# Patient Record
Sex: FEMALE | Race: WHITE | Employment: OTHER | URBAN - METROPOLITAN AREA
[De-identification: names, ages, dates, MRNs, and addresses within clinical notes are randomized per-mention and may not be internally consistent; named-entity substitution may affect disease eponyms.]

---

## 2017-03-21 DIAGNOSIS — E28.2 PCOS (POLYCYSTIC OVARIAN SYNDROME): Primary | ICD-10-CM

## 2017-03-21 LAB — HBA1C MFR BLD: 5.8 % (ref 4.3–6)

## 2017-03-21 PROCEDURE — 83036 HEMOGLOBIN GLYCOSYLATED A1C: CPT | Performed by: FAMILY MEDICINE

## 2017-03-21 PROCEDURE — 36415 COLL VENOUS BLD VENIPUNCTURE: CPT | Performed by: FAMILY MEDICINE

## 2018-03-06 DIAGNOSIS — E28.2 POLYCYSTIC OVARIES: Primary | ICD-10-CM

## 2018-03-06 LAB — HBA1C MFR BLD: 5.7 % (ref 4.3–6)

## 2018-03-06 PROCEDURE — 80069 RENAL FUNCTION PANEL: CPT

## 2018-03-06 PROCEDURE — 83036 HEMOGLOBIN GLYCOSYLATED A1C: CPT

## 2018-03-06 PROCEDURE — 36415 COLL VENOUS BLD VENIPUNCTURE: CPT

## 2018-03-06 PROCEDURE — 80061 LIPID PANEL: CPT

## 2018-03-07 LAB
ALBUMIN SERPL-MCNC: 3.5 G/DL (ref 3.4–5)
ANION GAP SERPL CALCULATED.3IONS-SCNC: 5 MMOL/L (ref 3–14)
BUN SERPL-MCNC: 8 MG/DL (ref 7–30)
CALCIUM SERPL-MCNC: 8.8 MG/DL (ref 8.5–10.1)
CHLORIDE SERPL-SCNC: 109 MMOL/L (ref 94–109)
CHOLEST SERPL-MCNC: 179 MG/DL
CO2 SERPL-SCNC: 26 MMOL/L (ref 20–32)
CREAT SERPL-MCNC: 0.76 MG/DL (ref 0.52–1.04)
GFR SERPL CREATININE-BSD FRML MDRD: >90 ML/MIN/1.7M2
GLUCOSE SERPL-MCNC: 91 MG/DL (ref 70–99)
HDLC SERPL-MCNC: 38 MG/DL
LDLC SERPL CALC-MCNC: 110 MG/DL
NONHDLC SERPL-MCNC: 141 MG/DL
PHOSPHATE SERPL-MCNC: 2 MG/DL (ref 2.5–4.5)
POTASSIUM SERPL-SCNC: 4.4 MMOL/L (ref 3.4–5.3)
SODIUM SERPL-SCNC: 140 MMOL/L (ref 133–144)
TRIGL SERPL-MCNC: 153 MG/DL

## 2019-05-07 DIAGNOSIS — E78.2 MIXED HYPERLIPIDEMIA: ICD-10-CM

## 2019-05-07 DIAGNOSIS — E28.2 POLYCYSTIC OVARIES: Primary | ICD-10-CM

## 2019-05-07 LAB
ALBUMIN SERPL-MCNC: 3.8 G/DL (ref 3.4–5)
ANION GAP SERPL CALCULATED.3IONS-SCNC: 8 MMOL/L (ref 3–14)
BUN SERPL-MCNC: 9 MG/DL (ref 7–30)
CALCIUM SERPL-MCNC: 9.3 MG/DL (ref 8.5–10.1)
CHLORIDE SERPL-SCNC: 110 MMOL/L (ref 94–109)
CHOLEST SERPL-MCNC: 183 MG/DL
CO2 SERPL-SCNC: 23 MMOL/L (ref 20–32)
CREAT SERPL-MCNC: 0.9 MG/DL (ref 0.52–1.04)
GFR SERPL CREATININE-BSD FRML MDRD: 86 ML/MIN/{1.73_M2}
GLUCOSE SERPL-MCNC: 101 MG/DL (ref 70–99)
HBA1C MFR BLD: 5.7 % (ref 0–5.6)
HDLC SERPL-MCNC: 34 MG/DL
LDLC SERPL CALC-MCNC: 125 MG/DL
NONHDLC SERPL-MCNC: 149 MG/DL
PHOSPHATE SERPL-MCNC: 2.3 MG/DL (ref 2.5–4.5)
POTASSIUM SERPL-SCNC: 4.5 MMOL/L (ref 3.4–5.3)
SODIUM SERPL-SCNC: 141 MMOL/L (ref 133–144)
TRIGL SERPL-MCNC: 121 MG/DL

## 2019-05-07 PROCEDURE — 80069 RENAL FUNCTION PANEL: CPT | Performed by: INTERNAL MEDICINE

## 2019-05-07 PROCEDURE — 80061 LIPID PANEL: CPT | Performed by: INTERNAL MEDICINE

## 2019-05-07 PROCEDURE — 36415 COLL VENOUS BLD VENIPUNCTURE: CPT | Performed by: INTERNAL MEDICINE

## 2019-05-07 PROCEDURE — 83036 HEMOGLOBIN GLYCOSYLATED A1C: CPT | Performed by: INTERNAL MEDICINE

## 2020-08-27 ENCOUNTER — OFFICE VISIT (OUTPATIENT)
Dept: FAMILY MEDICINE | Facility: CLINIC | Age: 30
End: 2020-08-27
Payer: COMMERCIAL

## 2020-08-27 VITALS
RESPIRATION RATE: 17 BRPM | HEART RATE: 78 BPM | WEIGHT: 293 LBS | OXYGEN SATURATION: 98 % | BODY MASS INDEX: 44.41 KG/M2 | DIASTOLIC BLOOD PRESSURE: 83 MMHG | SYSTOLIC BLOOD PRESSURE: 134 MMHG | HEIGHT: 68 IN | TEMPERATURE: 98.4 F

## 2020-08-27 DIAGNOSIS — F43.23 ADJUSTMENT DISORDER WITH MIXED ANXIETY AND DEPRESSED MOOD: ICD-10-CM

## 2020-08-27 DIAGNOSIS — E28.2 PCOS (POLYCYSTIC OVARIAN SYNDROME): Primary | ICD-10-CM

## 2020-08-27 DIAGNOSIS — E66.01 MORBID OBESITY (H): ICD-10-CM

## 2020-08-27 LAB
ALBUMIN SERPL-MCNC: 3.4 G/DL (ref 3.4–5)
ALP SERPL-CCNC: 76 U/L (ref 40–150)
ALT SERPL W P-5'-P-CCNC: 15 U/L (ref 0–50)
ANION GAP SERPL CALCULATED.3IONS-SCNC: 4 MMOL/L (ref 3–14)
AST SERPL W P-5'-P-CCNC: 9 U/L (ref 0–45)
BASOPHILS # BLD AUTO: 0 10E9/L (ref 0–0.2)
BASOPHILS NFR BLD AUTO: 0.4 %
BILIRUB SERPL-MCNC: 0.4 MG/DL (ref 0.2–1.3)
BUN SERPL-MCNC: 11 MG/DL (ref 7–30)
CALCIUM SERPL-MCNC: 9.3 MG/DL (ref 8.5–10.1)
CHLORIDE SERPL-SCNC: 112 MMOL/L (ref 94–109)
CHOLEST SERPL-MCNC: 178 MG/DL
CO2 SERPL-SCNC: 23 MMOL/L (ref 20–32)
CREAT SERPL-MCNC: 0.87 MG/DL (ref 0.52–1.04)
DIFFERENTIAL METHOD BLD: NORMAL
EOSINOPHIL # BLD AUTO: 0.1 10E9/L (ref 0–0.7)
EOSINOPHIL NFR BLD AUTO: 1.1 %
ERYTHROCYTE [DISTWIDTH] IN BLOOD BY AUTOMATED COUNT: 12.7 % (ref 10–15)
GFR SERPL CREATININE-BSD FRML MDRD: 88 ML/MIN/{1.73_M2}
GLUCOSE SERPL-MCNC: 103 MG/DL (ref 70–99)
HBA1C MFR BLD: 5.7 % (ref 0–5.6)
HCT VFR BLD AUTO: 43.2 % (ref 35–47)
HDLC SERPL-MCNC: 45 MG/DL
HGB BLD-MCNC: 14.4 G/DL (ref 11.7–15.7)
LDLC SERPL CALC-MCNC: 104 MG/DL
LYMPHOCYTES # BLD AUTO: 2.6 10E9/L (ref 0.8–5.3)
LYMPHOCYTES NFR BLD AUTO: 35.5 %
MCH RBC QN AUTO: 30.2 PG (ref 26.5–33)
MCHC RBC AUTO-ENTMCNC: 33.3 G/DL (ref 31.5–36.5)
MCV RBC AUTO: 91 FL (ref 78–100)
MONOCYTES # BLD AUTO: 0.3 10E9/L (ref 0–1.3)
MONOCYTES NFR BLD AUTO: 4 %
NEUTROPHILS # BLD AUTO: 4.3 10E9/L (ref 1.6–8.3)
NEUTROPHILS NFR BLD AUTO: 59 %
NONHDLC SERPL-MCNC: 133 MG/DL
PLATELET # BLD AUTO: 263 10E9/L (ref 150–450)
POTASSIUM SERPL-SCNC: 4.1 MMOL/L (ref 3.4–5.3)
PROT SERPL-MCNC: 7.5 G/DL (ref 6.8–8.8)
RBC # BLD AUTO: 4.77 10E12/L (ref 3.8–5.2)
SODIUM SERPL-SCNC: 139 MMOL/L (ref 133–144)
TRIGL SERPL-MCNC: 143 MG/DL
TSH SERPL DL<=0.005 MIU/L-ACNC: 1.79 MU/L (ref 0.4–4)
WBC # BLD AUTO: 7.2 10E9/L (ref 4–11)

## 2020-08-27 PROCEDURE — 80050 GENERAL HEALTH PANEL: CPT | Performed by: PHYSICIAN ASSISTANT

## 2020-08-27 PROCEDURE — 96127 BRIEF EMOTIONAL/BEHAV ASSMT: CPT | Performed by: PHYSICIAN ASSISTANT

## 2020-08-27 PROCEDURE — 36415 COLL VENOUS BLD VENIPUNCTURE: CPT | Performed by: PHYSICIAN ASSISTANT

## 2020-08-27 PROCEDURE — 83036 HEMOGLOBIN GLYCOSYLATED A1C: CPT | Performed by: PHYSICIAN ASSISTANT

## 2020-08-27 PROCEDURE — 80061 LIPID PANEL: CPT | Performed by: PHYSICIAN ASSISTANT

## 2020-08-27 PROCEDURE — 99203 OFFICE O/P NEW LOW 30 MIN: CPT | Performed by: PHYSICIAN ASSISTANT

## 2020-08-27 RX ORDER — METFORMIN HCL 500 MG
1000 TABLET, EXTENDED RELEASE 24 HR ORAL
COMMUNITY
End: 2021-06-24

## 2020-08-27 RX ORDER — ETONOGESTREL AND ETHINYL ESTRADIOL VAGINAL RING .015; .12 MG/D; MG/D
1 RING VAGINAL
COMMUNITY
End: 2022-11-29

## 2020-08-27 RX ORDER — METFORMIN HCL 500 MG
1000 TABLET, EXTENDED RELEASE 24 HR ORAL
Qty: 180 TABLET | Refills: 0 | Status: SHIPPED | OUTPATIENT
Start: 2020-08-27 | End: 2020-10-29

## 2020-08-27 ASSESSMENT — MIFFLIN-ST. JEOR: SCORE: 2123.16

## 2020-08-27 NOTE — PROGRESS NOTES
"Subjective     Livia Fallon is a 30 year old female who presents to clinic today for the following health issues:    HPI     Patient would like to discuss her recent Polycystic Ovarian Syndrome-    31 y/o NP female here to discuss a couple of issues.  She was dx with PCOS many years ago.  She has followed with endocrine, but been over 1 year since last visit.  She has been stable on metformin and nuvaring..  This did help her lose some weight initially, and did help her energy, but over time those effects have not been present.  Her last A1c was 5.7.  Does not check blood sugars at home.  Does not follow any specific diet.    She has also been having some more depressive and anxiety type symptoms.  She has not really had much of this in the past, but does think the many external stressors are playing a role.            Review of Systems   Constitutional, HEENT, cardiovascular, pulmonary, gi and gu systems are negative, except as otherwise noted.      Objective    /83   Pulse 78   Temp 98.4  F (36.9  C) (Tympanic)   Resp 17   Ht 1.715 m (5' 7.5\")   Wt 136.8 kg (301 lb 8 oz)   LMP 08/17/2020 (Approximate)   SpO2 98%   Breastfeeding No   BMI 46.52 kg/m    Body mass index is 46.52 kg/m .  Physical Exam   GENERAL: alert and no distress  EYES: Eyes grossly normal to inspection  RESP: lungs clear to auscultation - no rales, rhonchi or wheezes  CV: regular rate and rhythm, normal S1 S2, no S3 or S4, no murmur, click or rub, no peripheral edema and peripheral pulses strong  PSYCH: mentation appears normal, affect normal/bright            Assessment & Plan     PCOS (polycystic ovarian syndrome)  Will update labs.  Depending on blood sugars, may taper up metformin.  Also discussed the potential benefit of OTC NAC.  - Comprehensive metabolic panel  - Hemoglobin A1c  - Lipid panel reflex to direct LDL Fasting  - TSH with free T4 reflex  - metFORMIN (GLUCOPHAGE-XR) 500 MG 24 hr tablet; Take 2 tablets (1,000 mg) " "by mouth daily (with dinner)  - CBC with platelets differential    Morbid obesity (H)  Spent significant time discussing dietary and lifestyle changes to help facilitate weight loss.    Adjustment disorder with mixed anxiety and depressed mood  Discussed options for treatment, at this time going to hold off on medication trial.  Will continue to follow up based on symptoms.  - EMOTIONAL / BEHAVIORAL ASSESSMENT     BMI:   Estimated body mass index is 46.52 kg/m  as calculated from the following:    Height as of this encounter: 1.715 m (5' 7.5\").    Weight as of this encounter: 136.8 kg (301 lb 8 oz).   Weight management plan: Discussed healthy diet and exercise guidelines            No follow-ups on file.    Jhon Solomon PA-C  [facility]    "

## 2020-08-27 NOTE — NURSING NOTE
"Chief Complaint   Patient presents with     Consult     Polycystic Ovarian Syndrome     /83   Pulse 78   Temp 98.4  F (36.9  C) (Tympanic)   Resp 17   Ht 1.715 m (5' 7.5\")   Wt 136.8 kg (301 lb 8 oz)   LMP 08/17/2020 (Approximate)   SpO2 98%   Breastfeeding No   BMI 46.52 kg/m   Estimated body mass index is 46.52 kg/m  as calculated from the following:    Height as of this encounter: 1.715 m (5' 7.5\").    Weight as of this encounter: 136.8 kg (301 lb 8 oz).  Medication Reconciliation: complete        Health Maintenance Due Pending Provider Review:  Pap Smear    Pt reported. Sent to abstracting.    Rosa Chiu MA  [facility]  [phone]  "

## 2020-08-28 ASSESSMENT — ANXIETY QUESTIONNAIRES
7. FEELING AFRAID AS IF SOMETHING AWFUL MIGHT HAPPEN: NEARLY EVERY DAY
GAD7 TOTAL SCORE: 13
5. BEING SO RESTLESS THAT IT IS HARD TO SIT STILL: SEVERAL DAYS
2. NOT BEING ABLE TO STOP OR CONTROL WORRYING: SEVERAL DAYS
3. WORRYING TOO MUCH ABOUT DIFFERENT THINGS: MORE THAN HALF THE DAYS
6. BECOMING EASILY ANNOYED OR IRRITABLE: MORE THAN HALF THE DAYS
IF YOU CHECKED OFF ANY PROBLEMS ON THIS QUESTIONNAIRE, HOW DIFFICULT HAVE THESE PROBLEMS MADE IT FOR YOU TO DO YOUR WORK, TAKE CARE OF THINGS AT HOME, OR GET ALONG WITH OTHER PEOPLE: SOMEWHAT DIFFICULT
1. FEELING NERVOUS, ANXIOUS, OR ON EDGE: SEVERAL DAYS
4. TROUBLE RELAXING: NEARLY EVERY DAY

## 2020-08-28 ASSESSMENT — PATIENT HEALTH QUESTIONNAIRE - PHQ9: SUM OF ALL RESPONSES TO PHQ QUESTIONS 1-9: 9

## 2020-08-28 NOTE — RESULT ENCOUNTER NOTE
Dear Livia    Your test results are attached, feel free to contact me via Zuffle     It was very nice to meet and chat with you yesterday.  As you can see from your results, things look pretty stable from last year.  Your A1c (the 3 month blood sugar average) is at 5.7.  This is just over the desired range.  One option is to increase your metformin.  You currently take 1000 mg and increasing to 1500 mg makes sense.  This may help augment some weight loss as well.  The most important thing is to be consistent with the healthy food choices and quantities.  The next step is do you want to pursue medication to help your mood at this time.  Other options include counseling, and also those healthy lifestyle/diet choice will also help the brain chemistry.  Let me know what you think.    Emmett Solomon PA-C

## 2020-08-29 ASSESSMENT — ANXIETY QUESTIONNAIRES: GAD7 TOTAL SCORE: 13

## 2020-09-01 ENCOUNTER — MYC MEDICAL ADVICE (OUTPATIENT)
Dept: FAMILY MEDICINE | Facility: CLINIC | Age: 30
End: 2020-09-01

## 2020-09-01 DIAGNOSIS — F43.23 ADJUSTMENT DISORDER WITH MIXED ANXIETY AND DEPRESSED MOOD: Primary | ICD-10-CM

## 2020-09-01 RX ORDER — ESCITALOPRAM OXALATE 10 MG/1
10 TABLET ORAL DAILY
Qty: 30 TABLET | Refills: 1 | Status: SHIPPED | OUTPATIENT
Start: 2020-09-01 | End: 2020-11-04

## 2020-10-29 ENCOUNTER — MYC REFILL (OUTPATIENT)
Dept: FAMILY MEDICINE | Facility: CLINIC | Age: 30
End: 2020-10-29

## 2020-10-29 ENCOUNTER — MYC MEDICAL ADVICE (OUTPATIENT)
Dept: FAMILY MEDICINE | Facility: CLINIC | Age: 30
End: 2020-10-29

## 2020-10-29 DIAGNOSIS — E28.2 PCOS (POLYCYSTIC OVARIAN SYNDROME): ICD-10-CM

## 2020-10-29 RX ORDER — METFORMIN HCL 500 MG
1000 TABLET, EXTENDED RELEASE 24 HR ORAL
Qty: 180 TABLET | Refills: 0 | Status: SHIPPED | OUTPATIENT
Start: 2020-10-29 | End: 2020-10-29

## 2020-10-29 RX ORDER — METFORMIN HCL 500 MG
TABLET, EXTENDED RELEASE 24 HR ORAL
Qty: 270 TABLET | Refills: 0 | Status: SHIPPED | OUTPATIENT
Start: 2020-10-29 | End: 2021-01-14

## 2020-10-29 NOTE — TELEPHONE ENCOUNTER
JS,  Please see [employer] message below.  Order T'd up.  Thanks,  Harika Guillory RN      Note from 8/27/2020 OV:  It was very nice to meet and chat with you yesterday.  As you can see from your results, things look pretty stable from last year.  Your A1c (the 3 month blood sugar average) is at 5.7.  This is just over the desired range.  One option is to increase your metformin.  You currently take 1000 mg and increasing to 1500 mg makes sense.  This may help augment some weight loss as well

## 2020-10-29 NOTE — TELEPHONE ENCOUNTER
"Requested Prescriptions   Pending Prescriptions Disp Refills     metFORMIN (GLUCOPHAGE-XR) 500 MG 24 hr tablet 180 tablet 0     Sig: Take 2 tablets (1,000 mg) by mouth daily (with dinner)       Biguanide Agents Passed - 10/29/2020 11:13 AM        Passed - Patient is age 10 or older        Passed - Recent (12 mo) or future (30 days) visit within the authorizing provider's specialty      Patient has had an office visit with the authorizing provider or a provider within the authorizing providers department within the previous 12 mos or has a future within next 30 days. See \"Patient Info\" tab in inbasket, or \"Choose Columns\" in Meds & Orders section of the refill encounter.              Passed - Patient does NOT have a diagnosis of CHF.        Passed - Medication is active on med list        Passed - Patient is not pregnant        Passed - Patient has not had a positive pregnancy test within the past 12 mos.          Prescription approved per [facility].  "

## 2020-11-22 ENCOUNTER — HEALTH MAINTENANCE LETTER (OUTPATIENT)
Age: 30
End: 2020-11-22

## 2020-12-01 ENCOUNTER — TRANSFERRED RECORDS (OUTPATIENT)
Dept: MULTI SPECIALTY CLINIC | Facility: CLINIC | Age: 30
End: 2020-12-01

## 2020-12-01 LAB — PAP SMEAR - HIM PATIENT REPORTED: NEGATIVE

## 2021-01-01 DIAGNOSIS — F43.23 ADJUSTMENT DISORDER WITH MIXED ANXIETY AND DEPRESSED MOOD: ICD-10-CM

## 2021-01-02 RX ORDER — ESCITALOPRAM OXALATE 10 MG/1
TABLET ORAL
Qty: 30 TABLET | Refills: 1 | OUTPATIENT
Start: 2021-01-02

## 2021-01-14 ENCOUNTER — VIRTUAL VISIT (OUTPATIENT)
Dept: FAMILY MEDICINE | Facility: CLINIC | Age: 31
End: 2021-01-14
Payer: COMMERCIAL

## 2021-01-14 DIAGNOSIS — F43.23 ADJUSTMENT DISORDER WITH MIXED ANXIETY AND DEPRESSED MOOD: ICD-10-CM

## 2021-01-14 DIAGNOSIS — E28.2 PCOS (POLYCYSTIC OVARIAN SYNDROME): ICD-10-CM

## 2021-01-14 PROCEDURE — 99214 OFFICE O/P EST MOD 30 MIN: CPT | Mod: 95 | Performed by: PHYSICIAN ASSISTANT

## 2021-01-14 RX ORDER — METFORMIN HCL 500 MG
TABLET, EXTENDED RELEASE 24 HR ORAL
Qty: 270 TABLET | Refills: 0 | Status: SHIPPED | OUTPATIENT
Start: 2021-01-14 | End: 2021-04-21

## 2021-01-14 RX ORDER — ESCITALOPRAM OXALATE 10 MG/1
15 TABLET ORAL DAILY
Qty: 135 TABLET | Refills: 1 | Status: SHIPPED | OUTPATIENT
Start: 2021-01-14 | End: 2021-01-27

## 2021-01-14 NOTE — PROGRESS NOTES
Livia is a 30 year old who is being evaluated via a billable video visit.      How would you like to obtain your AVS? Mayur  If the video visit is dropped, the invitation should be resent by: Mayur  Will anyone else be joining your video visit? No    Video Start Time: 11:45 AM  Assessment & Plan     Adjustment disorder with mixed anxiety and depressed mood  Much improved, very happy with how it is works.  No adjustments needed at this time.  - escitalopram (LEXAPRO) 10 MG tablet; Take 1.5 tablets (15 mg) by mouth daily    PCOS (polycystic ovarian syndrome)  The increased dose has helped some symptoms as well.  - metFORMIN (GLUCOPHAGE-XR) 500 MG 24 hr tablet; Take 3 tablets (1500 mg) daily with dinner          Return in about 6 months (around 7/14/2021).    Jhon Solomon PA-C  [facility]     Livia is a 30 year old who presents to clinic today for the following health issues     HPI       Depression and Anxiety Follow-Up    How are you doing with your depression since your last visit? Improved     How are you doing with your anxiety since your last visit?  Improved     Are you having other symptoms that might be associated with depression or anxiety? No    Have you had a significant life event? No     Do you have any concerns with your use of alcohol or other drugs? No    Social History     Tobacco Use     Smoking status: Never Smoker     Smokeless tobacco: Never Used   Substance Use Topics     Alcohol use: Yes     Comment: once/month     Drug use: Yes     Types: Marijuana     Comment: weed every few weeks     PHQ 8/28/2020 1/5/2021   PHQ-9 Total Score 9 12   Q9: Thoughts of better off dead/self-harm past 2 weeks Not at all Several days     PRISCILLA-7 SCORE 8/28/2020 1/5/2021   Total Score 13 4         Suicide Assessment Five-step Evaluation and Treatment (SAFE-T)        Review of Systems   Constitutional, HEENT, cardiovascular, pulmonary, gi and gu systems are negative,  except as otherwise noted.      Objective           Vitals:  No vitals were obtained today due to virtual visit.    Physical Exam   GENERAL: alert and no distress  EYES: Eyes grossly normal to inspection.  No discharge or erythema, or obvious scleral/conjunctival abnormalities.  RESP: No audible wheeze, cough, or visible cyanosis.  No visible retractions or increased work of breathing.    SKIN: Visible skin clear. No significant rash, abnormal pigmentation or lesions.  NEURO: Cranial nerves grossly intact.  Mentation and speech appropriate for age.  PSYCH: Mentation appears normal, affect normal/bright, judgement and insight intact, normal speech and appearance well-groomed.                Video-Visit Details    Type of service:  Video Visit    Video End Time:11:54 AM    Originating Location (pt. Location): Home    Distant Location (provider location):  [facility]     Platform used for Video Visit: Topic

## 2021-01-14 NOTE — Clinical Note
Please abstract the following data from this visit with this patient into the appropriate field in Epic:    Tests that can be patient reported without a hard copy:    Pap smear done on this date: 12/1/2020 (approximately), [facility], results were normal.     Other Tests found in the patient's chart through Chart Review/Care Everywhere:    {Abstract Quality List (Optional):290433}    Note to Abstraction: If this section is blank, no results were found via Chart Review/Care Everywhere.

## 2021-01-25 ENCOUNTER — TELEPHONE (OUTPATIENT)
Dept: FAMILY MEDICINE | Facility: CLINIC | Age: 31
End: 2021-01-25

## 2021-01-25 DIAGNOSIS — F43.23 ADJUSTMENT DISORDER WITH MIXED ANXIETY AND DEPRESSED MOOD: Primary | ICD-10-CM

## 2021-01-25 NOTE — TELEPHONE ENCOUNTER
PA team,      Received fax from [facility] at [address] for Escitalopram 10 mg - take 15 mg once daily.    Plan phone number : [phone]  ID# [id number]    Thanks,  Harika Guillory RN

## 2021-01-26 NOTE — TELEPHONE ENCOUNTER
Central Prior Authorization Team  Phone: [phone]    PA Initiation    Medication: Escitalopram  Insurance Company: [employer] - Phone [phone] Fax [phone]  Pharmacy Filling the Rx: [id number] [address]  Filling Pharmacy Phone: [phone]  Filling Pharmacy Fax:    Start Date: 1/26/2021

## 2021-01-27 ENCOUNTER — TELEPHONE (OUTPATIENT)
Dept: FAMILY MEDICINE | Facility: CLINIC | Age: 31
End: 2021-01-27

## 2021-01-27 RX ORDER — ESCITALOPRAM OXALATE 20 MG/1
10 TABLET ORAL DAILY
Qty: 135 TABLET | Refills: 1 | Status: SHIPPED | OUTPATIENT
Start: 2021-01-27 | End: 2021-06-24

## 2021-01-27 RX ORDER — ESCITALOPRAM OXALATE 10 MG/1
5 TABLET ORAL DAILY
Qty: 135 TABLET | Refills: 1 | Status: SHIPPED | OUTPATIENT
Start: 2021-01-27 | End: 2021-06-24

## 2021-01-27 NOTE — TELEPHONE ENCOUNTER
Spoke with pharmacy.  Reviewed Lexapro Rx instructions for both 10 mg and 20 mg sent in today.  Harika Guillory RN

## 2021-01-27 NOTE — TELEPHONE ENCOUNTER
Received a call from Lg at [facility], they will covered Escitalopram 20mg strength (taking half of a tablet) and 10mg strength (taking half of a tablet) to make up the dose for 15mg. Please send new rx's to the pharmacy for these strengths.

## 2021-01-27 NOTE — TELEPHONE ENCOUNTER
JS,    Please see message below from 8:32 today.  Orders T'd up.    Thanks,  Hariak Guillory RN

## 2021-01-27 NOTE — TELEPHONE ENCOUNTER
Talked to Lg at [facility]- since pt previously got one month supply of the 10mg, claims at the pharmacy are rejecting against the last fill. He did put in a one time override for the pharmacy to fill the 20mg and 10mg strengths. There shouldn't be any issues after this fill. The pharmacy received paid claims.

## 2021-01-27 NOTE — TELEPHONE ENCOUNTER
Reason for Call:  Other call back    Detailed comments: Sukh[facility] calling needs Clarification on Instructions for  escitalopram (LEXAPRO) 20 MG tablet    Phone Number Sukhdev can be reached at: Other phone number:  [phone]    Best Time: Today    Can we leave a detailed message on this number? Not Applicable    Call taken on 1/27/2021 at 9:44 AM by Amarilys Carreno

## 2021-03-09 DIAGNOSIS — F43.23 ADJUSTMENT DISORDER WITH MIXED ANXIETY AND DEPRESSED MOOD: ICD-10-CM

## 2021-03-09 NOTE — TELEPHONE ENCOUNTER
Changed from having two precriptions to equal 15 mg,  To just on prescription for  10 mg with directions for 1.5 tabletes.

## 2021-03-10 RX ORDER — ESCITALOPRAM OXALATE 10 MG/1
15 TABLET ORAL DAILY
Qty: 135 TABLET | Refills: 1 | OUTPATIENT
Start: 2021-03-10

## 2021-04-21 DIAGNOSIS — E28.2 PCOS (POLYCYSTIC OVARIAN SYNDROME): ICD-10-CM

## 2021-04-21 RX ORDER — METFORMIN HCL 500 MG
TABLET, EXTENDED RELEASE 24 HR ORAL
Qty: 270 TABLET | Refills: 0 | Status: SHIPPED | OUTPATIENT
Start: 2021-04-21 | End: 2021-06-24

## 2021-05-04 ENCOUNTER — IMMUNIZATION (OUTPATIENT)
Dept: NURSING | Facility: CLINIC | Age: 31
End: 2021-05-04
Payer: COMMERCIAL

## 2021-05-04 PROCEDURE — 0001A PR COVID VAC PFIZER DIL RECON 30 MCG/0.3 ML IM: CPT

## 2021-05-04 PROCEDURE — 91300 PR COVID VAC PFIZER DIL RECON 30 MCG/0.3 ML IM: CPT

## 2021-05-25 ENCOUNTER — IMMUNIZATION (OUTPATIENT)
Dept: NURSING | Facility: CLINIC | Age: 31
End: 2021-05-25
Attending: INTERNAL MEDICINE
Payer: COMMERCIAL

## 2021-05-25 PROCEDURE — 0002A PR COVID VAC PFIZER DIL RECON 30 MCG/0.3 ML IM: CPT

## 2021-05-25 PROCEDURE — 91300 PR COVID VAC PFIZER DIL RECON 30 MCG/0.3 ML IM: CPT

## 2021-06-23 NOTE — PROGRESS NOTES
"Livia is a 30 year old who is being evaluated via a billable video visit.      How would you like to obtain your AVS? MyChart  If the video visit is dropped, the invitation should be resent by:   Will anyone else be joining your video visit? No    Video Start Time: 12:24 PM    Assessment & Plan     PCOS (polycystic ovarian syndrome)  Has been working well, symptoms controlled.  - metFORMIN (GLUCOPHAGE-XR) 500 MG 24 hr tablet; Take 3 tablets (1500 mg) daily with dinner    Adjustment disorder with mixed anxiety and depressed mood  Will trial an increase as this has been quite helpful for her in the past.  Continue to work on non medication treatments, exercise, healthy diet, consistent sleep, etc.  - escitalopram (LEXAPRO) 20 MG tablet; Take 1 tablet (20 mg) by mouth daily              BMI:   Estimated body mass index is 46.52 kg/m  as calculated from the following:    Height as of 8/27/20: 1.715 m (5' 7.5\").    Weight as of 8/27/20: 136.8 kg (301 lb 8 oz).           Return in about 4 weeks (around 7/22/2021).    ALANNA Harding [facility]   Livia is a 30 year old who presents for the following health issues     HPI     Depression and Anxiety Follow-Up    How are you doing with your depression since your last visit? Pt states she felt really good and now feels less of herself     How are you doing with your anxiety since your last visit?  Pt states she felt really good and now feels less of herself     Are you having other symptoms that might be associated with depression or anxiety? Yes:  wanting to do less with friends    Have you had a significant life event? OTHER: money and job      Do you have any concerns with your use of alcohol or other drugs? No    Social History     Tobacco Use     Smoking status: Never Smoker     Smokeless tobacco: Never Used   Substance Use Topics     Alcohol use: Yes     Comment: once/month     Drug use: Yes     Types: Marijuana     " Comment: weed every few weeks     PHQ 8/28/2020 1/5/2021   PHQ-9 Total Score 9 12   Q9: Thoughts of better off dead/self-harm past 2 weeks Not at all Several days     PRISCILLA-7 SCORE 8/28/2020 1/5/2021   Total Score 13 4         Suicide Assessment Five-step Evaluation and Treatment (SAFE-T)      How many servings of fruits and vegetables do you eat daily?  Varies salad and V8    On average, how many sweetened beverages do you drink each day (Examples: soda, juice, sweet tea, etc.  Do NOT count diet or artificially sweetened beverages)?   V8, tea with honey and sugar     How many days per week do you exercise enough to make your heart beat faster? Not enough technically 3 days if you count work     How many minutes a day do you exercise enough to make your heart beat faster?   How many days per week do you miss taking your medication? Some days     What makes it hard for you to take your medications?  remembering to take        Review of Systems   Constitutional, HEENT, cardiovascular, pulmonary, gi and gu systems are negative, except as otherwise noted.      Objective           Vitals:  No vitals were obtained today due to virtual visit.    Physical Exam   GENERAL: Healthy, alert and no distress  EYES: Eyes grossly normal to inspection.  No discharge or erythema, or obvious scleral/conjunctival abnormalities.  RESP: No audible wheeze, cough, or visible cyanosis.  No visible retractions or increased work of breathing.    SKIN: Visible skin clear. No significant rash, abnormal pigmentation or lesions.  NEURO: Cranial nerves grossly intact.  Mentation and speech appropriate for age.  PSYCH: Mentation appears normal, affect normal/bright, judgement and insight intact, normal speech and appearance well-groomed.                Video-Visit Details    Type of service:  Video Visit    Video End Time:12:33 PM    Originating Location (pt. Location): Home    Distant Location (provider location):  [facility]      Platform used for Video Visit: Sukhwinder

## 2021-06-24 ENCOUNTER — VIRTUAL VISIT (OUTPATIENT)
Dept: FAMILY MEDICINE | Facility: CLINIC | Age: 31
End: 2021-06-24
Payer: COMMERCIAL

## 2021-06-24 DIAGNOSIS — F43.23 ADJUSTMENT DISORDER WITH MIXED ANXIETY AND DEPRESSED MOOD: ICD-10-CM

## 2021-06-24 DIAGNOSIS — E28.2 PCOS (POLYCYSTIC OVARIAN SYNDROME): ICD-10-CM

## 2021-06-24 PROCEDURE — 99213 OFFICE O/P EST LOW 20 MIN: CPT | Mod: 95 | Performed by: PHYSICIAN ASSISTANT

## 2021-06-24 RX ORDER — ESCITALOPRAM OXALATE 20 MG/1
20 TABLET ORAL DAILY
Qty: 90 TABLET | Refills: 1 | Status: SHIPPED | OUTPATIENT
Start: 2021-06-24 | End: 2022-01-04

## 2021-06-24 RX ORDER — ESCITALOPRAM OXALATE 20 MG/1
20 TABLET ORAL DAILY
Qty: 90 TABLET | Refills: 1 | Status: SHIPPED | OUTPATIENT
Start: 2021-06-24 | End: 2021-06-24

## 2021-06-24 RX ORDER — METFORMIN HCL 500 MG
TABLET, EXTENDED RELEASE 24 HR ORAL
Qty: 270 TABLET | Refills: 1 | Status: SHIPPED | OUTPATIENT
Start: 2021-06-24 | End: 2021-12-13

## 2021-09-19 ENCOUNTER — HEALTH MAINTENANCE LETTER (OUTPATIENT)
Age: 31
End: 2021-09-19

## 2021-12-12 ENCOUNTER — NURSE TRIAGE (OUTPATIENT)
Dept: NURSING | Facility: CLINIC | Age: 31
End: 2021-12-12
Payer: COMMERCIAL

## 2021-12-12 DIAGNOSIS — E28.2 PCOS (POLYCYSTIC OVARIAN SYNDROME): ICD-10-CM

## 2021-12-12 NOTE — TELEPHONE ENCOUNTER
Patient says she is out of Metformin medication and the pharmacy says they have requested twice with no answer.   Triage guidelines recommend to call pcp within 24 hours. Caller verbalized and understands directives.  COVID 19 Nurse Triage Plan/Patient Instructions    Please be aware that novel coronavirus (COVID-19) may be circulating in the community. If you develop symptoms such as fever, cough, or SOB or if you have concerns about the presence of another infection including coronavirus (COVID-19), please contact your health care provider or visit [email or web address].     Disposition/Instructions    Virtual Visit with provider recommended. Reference Visit Selection Guide.    Thank you for taking steps to prevent the spread of this virus.  o Limit your contact with others.  o Wear a simple mask to cover your cough.  o Wash your hands well and often.    Resources    [facility]: About COVID-19: [email or web address]    CDC: What to Do If You're Sick: www.cdc.gov/coronavirus/2019-ncov/about/steps-when-sick.html    CDC: Ending Home Isolation: www.cdc.gov/coronavirus/2019-ncov/hcp/disposition-in-home-patients.html     CDC: Caring for Someone: www.cdc.gov/coronavirus/2019-ncov/if-you-are-sick/care-for-someone.html     [facility]: Interim Guidance for Hospital Discharge to Home: www.health.[facility]/diseases/coronavirus/hcp/hospdischarge.pdf    [facility] clinical trials (COVID-19 research studies): clinicalaffairs.[facility]-clinical-trials     Below are the COVID-19 hotlines at the [facility] ([facility]). Interpreters are available.   o For health questions: Call 710-496-9347 or 1-129.828.1200 (7 a.m. to 7 p.m.)  o For questions about schools and childcare: Call 289-504-3881 or 1-695.996.3770 (7 a.m. to 7 p.m.)                   Reason for Disposition    [1] Caller has NON-URGENT medication question about med that PCP prescribed AND [2] triager unable to answer  "question    Additional Information    Negative: Drug overdose and triager unable to answer question    Negative: Caller requesting information unrelated to medicine    Negative: Caller requesting a prescription for Strep throat and has a positive culture result    Negative: Rash while taking a medication or within 3 days of stopping it    Negative: Immunization reaction suspected    Negative: [1] Asthma and [2] having symptoms of asthma (cough, wheezing, etc.)    Negative: [1] Influenza symptoms AND [2] anti-viral med prescription request, such as Tamiflu    Negative: [1] Symptom of illness (e.g., headache, abdominal pain, earache, vomiting) AND [2] more than mild    Negative: MORE THAN A DOUBLE DOSE of a prescription or over-the-counter (OTC) drug    Negative: [1] DOUBLE DOSE (an extra dose or lesser amount) of over-the-counter (OTC) drug AND [2] any symptoms (e.g., dizziness, nausea, pain, sleepiness)    Negative: [1] DOUBLE DOSE (an extra dose or lesser amount) of prescription drug AND [2] any symptoms (e.g., dizziness, nausea, pain, sleepiness)    Negative: Took another person's prescription drug    Negative: [1] DOUBLE DOSE (an extra dose or lesser amount) of prescription drug AND [2] NO symptoms (Exception: a double dose of antibiotics)    Negative: Diabetes drug error or overdose (e.g., took wrong type of insulin or took extra dose)    Negative: [1] Request for URGENT new prescription or refill of \"essential\" medication (i.e., likelihood of harm to patient if not taken) AND [2] triager unable to fill per unit policy    Negative: [1] Prescription not at pharmacy AND [2] was prescribed by PCP recently    Negative: [1] Pharmacy calling with prescription questions AND [2] triager unable to answer question    Negative: [1] Caller has URGENT medication question about med that PCP or specialist prescribed AND [2] triager unable to answer question    Protocols used: MEDICATION QUESTION CALL-A-AH      "

## 2021-12-13 RX ORDER — METFORMIN HCL 500 MG
TABLET, EXTENDED RELEASE 24 HR ORAL
Qty: 90 TABLET | Refills: 0 | Status: SHIPPED | OUTPATIENT
Start: 2021-12-13 | End: 2022-01-05

## 2021-12-13 NOTE — TELEPHONE ENCOUNTER
Medication is being filled for 1 time refill only due to:  Patient needs to be seen because Due for follow up.     Note from 6/24 VV:   Return in about 4 weeks (around 7/22/2021).        Harika Guillory RN

## 2022-01-03 DIAGNOSIS — F43.23 ADJUSTMENT DISORDER WITH MIXED ANXIETY AND DEPRESSED MOOD: ICD-10-CM

## 2022-01-04 RX ORDER — ESCITALOPRAM OXALATE 20 MG/1
20 TABLET ORAL DAILY
Qty: 30 TABLET | Refills: 0 | Status: SHIPPED | OUTPATIENT
Start: 2022-01-04 | End: 2022-02-03

## 2022-01-04 NOTE — TELEPHONE ENCOUNTER
Medication is being filled for 1 time refill only due to:  Patient needs to be seen because due for follow up. .     Note from 6/24/21 VV:    Return in about 4 weeks (around 7/22/2021).    Harika Guillory RN

## 2022-01-05 ENCOUNTER — MYC REFILL (OUTPATIENT)
Dept: FAMILY MEDICINE | Facility: CLINIC | Age: 32
End: 2022-01-05
Payer: COMMERCIAL

## 2022-01-05 DIAGNOSIS — F43.23 ADJUSTMENT DISORDER WITH MIXED ANXIETY AND DEPRESSED MOOD: ICD-10-CM

## 2022-01-05 DIAGNOSIS — E28.2 PCOS (POLYCYSTIC OVARIAN SYNDROME): ICD-10-CM

## 2022-01-05 RX ORDER — ESCITALOPRAM OXALATE 20 MG/1
20 TABLET ORAL DAILY
Qty: 30 TABLET | Refills: 0 | Status: CANCELLED | OUTPATIENT
Start: 2022-01-05

## 2022-01-07 RX ORDER — METFORMIN HCL 500 MG
TABLET, EXTENDED RELEASE 24 HR ORAL
Qty: 90 TABLET | Refills: 0 | Status: SHIPPED | OUTPATIENT
Start: 2022-01-07 | End: 2022-02-15

## 2022-01-07 NOTE — TELEPHONE ENCOUNTER
Medication is being filled for 1 time refill only due to:  Patient needs to be seen because due for follow up .     Harika Guillory RN

## 2022-02-25 ASSESSMENT — ENCOUNTER SYMPTOMS
CONSTIPATION: 0
CHILLS: 0
PALPITATIONS: 0
HEADACHES: 0
SHORTNESS OF BREATH: 0
ABDOMINAL PAIN: 0
BREAST MASS: 0
PARESTHESIAS: 0
JOINT SWELLING: 0
ARTHRALGIAS: 0
HEMATOCHEZIA: 0
DIARRHEA: 0
NAUSEA: 0
NERVOUS/ANXIOUS: 1
EYE PAIN: 0
FEVER: 0
HEARTBURN: 0
SORE THROAT: 0
COUGH: 0
FREQUENCY: 0
WEAKNESS: 0
DIZZINESS: 0
MYALGIAS: 0
HEMATURIA: 0

## 2022-02-25 ASSESSMENT — PATIENT HEALTH QUESTIONNAIRE - PHQ9
SUM OF ALL RESPONSES TO PHQ QUESTIONS 1-9: 11
SUM OF ALL RESPONSES TO PHQ QUESTIONS 1-9: 11
10. IF YOU CHECKED OFF ANY PROBLEMS, HOW DIFFICULT HAVE THESE PROBLEMS MADE IT FOR YOU TO DO YOUR WORK, TAKE CARE OF THINGS AT HOME, OR GET ALONG WITH OTHER PEOPLE: VERY DIFFICULT

## 2022-02-25 NOTE — PROGRESS NOTES
SUBJECTIVE:   CC: Livia Fallon is an 31 year old woman who presents for preventive health visit.         Healthy Habits:     Getting at least 3 servings of Calcium per day:  NO    Bi-annual eye exam:  Yes    Dental care twice a year:  NO    Sleep apnea or symptoms of sleep apnea:  None    Diet:  Regular (no restrictions)    Frequency of exercise:  2-3 days/week    Duration of exercise:  30-45 minutes    Taking medications regularly:  Yes    Medication side effects:  None    PHQ-2 Total Score: 2    Additional concerns today:  Yes          Answers for HPI/ROS submitted by the patient on 2/25/2022  If you checked off any problems, how difficult have these problems made it for you to do your work, take care of things at home, or get along with other people?: Very difficult  PHQ9 TOTAL SCORE: 11    ADHD Referral.   Depression and Anxiety Follow-Up    How are you doing with your depression since your last visit? No change    How are you doing with your anxiety since your last visit?  Worsened     Are you having other symptoms that might be associated with depression or anxiety? No    Have you had a significant life event? No     Do you have any concerns with your use of alcohol or other drugs? No    Social History     Tobacco Use     Smoking status: Never Smoker     Smokeless tobacco: Never Used   Substance Use Topics     Alcohol use: Yes     Comment: once/month     Drug use: Yes     Types: Marijuana     Comment: weed every few weeks     PHQ 8/28/2020 1/5/2021 2/25/2022   PHQ-9 Total Score 9 12 11   Q9: Thoughts of better off dead/self-harm past 2 weeks Not at all Several days Several days   F/U: Thoughts of suicide or self-harm - - No   F/U: Safety concerns - - No     PRISCILLA-7 SCORE 8/28/2020 1/5/2021   Total Score 13 4           Follow Up Actions Taken  short term follow up     Discussed the following ways the patient can remain in a safe environment:  be around others  Suicide Assessment Five-step Evaluation and  Treatment (SAFE-T)      Today's PHQ-2 Score:   PHQ-2 ( 1999 [employer]) 2/25/2022   Q1: Little interest or pleasure in doing things 1   Q2: Feeling down, depressed or hopeless 1   PHQ-2 Score 2   Q1: Little interest or pleasure in doing things Several days   Q2: Feeling down, depressed or hopeless Several days   PHQ-2 Score 2       Abuse: Current or Past (Physical, Sexual or Emotional) - No  Do you feel safe in your environment? Yes    Have you ever done Advance Care Planning? (For example, a Health Directive, POLST, or a discussion with a medical provider or your loved ones about your wishes): No, advance care planning information given to patient to review.  Advanced care planning was discussed at today's visit.    Social History     Tobacco Use     Smoking status: Never Smoker     Smokeless tobacco: Never Used   Substance Use Topics     Alcohol use: Yes     Comment: once/month     If you drink alcohol do you typically have >3 drinks per day or >7 drinks per week? No    No flowsheet data found.    Reviewed orders with patient.  Reviewed health maintenance and updated orders accordingly - Yes  BP Readings from Last 3 Encounters:   02/28/22 (!) 138/95   08/27/20 134/83   06/02/15 130/88    Wt Readings from Last 3 Encounters:   02/28/22 134.7 kg (297 lb)   08/27/20 136.8 kg (301 lb 8 oz)   06/02/15 (!) 161.5 kg (356 lb)                    Breast Cancer Screening:  Any new diagnosis of family breast, ovarian, or bowel cancer? No    FHS-7: No flowsheet data found.    Patient under 40 years of age: Routine Mammogram Screening not recommended.   Pertinent mammograms are reviewed under the imaging tab.    History of abnormal Pap smear: NO - age 30-65 PAP every 5 years with negative HPV co-testing recommended  PAP / HPV 3/18/2010   PAP (Historical) NIL     Reviewed and updated as needed this visit by clinical staff   Tobacco  Allergies  Meds   Med Hx  Surg Hx  Fam Hx  Soc Hx        Reviewed and updated as needed this  "visit by Provider                 Over the last 6 months or so, she has had felt like her medication is not working as well as it used to.  She has also noticed some inattention/ADHD symptoms.  Unsure of history of any diagnosis.  Was dx with bipolar as very young youth, was on med for only a couple of weeks and she her mom said she became flat and boring.        Review of Systems   Constitutional: Negative for chills and fever.   HENT: Negative for congestion, ear pain, hearing loss and sore throat.    Eyes: Negative for pain and visual disturbance.   Respiratory: Negative for cough and shortness of breath.    Cardiovascular: Negative for chest pain, palpitations and peripheral edema.   Gastrointestinal: Negative for abdominal pain, constipation, diarrhea, heartburn, hematochezia and nausea.   Breasts:  Negative for tenderness, breast mass and discharge.   Genitourinary: Positive for pelvic pain. Negative for frequency, genital sores, hematuria, urgency, vaginal bleeding and vaginal discharge.   Musculoskeletal: Negative for arthralgias, joint swelling and myalgias.   Skin: Negative for rash.   Neurological: Negative for dizziness, weakness, headaches and paresthesias.   Psychiatric/Behavioral: Negative for mood changes. The patient is nervous/anxious.           OBJECTIVE:   BP (!) 138/95   Pulse 86   Temp 97.1  F (36.2  C) (Skin)   Resp 16   Ht 1.73 m (5' 8.11\")   Wt 134.7 kg (297 lb)   SpO2 96%   BMI 45.01 kg/m    Physical Exam  GENERAL: alert and no distress  EYES: Eyes grossly normal to inspection, PERRL and conjunctivae and sclerae normal  HENT: ear canals and TM's normal, nose and mouth without ulcers or lesions  NECK: no adenopathy, no asymmetry, masses, or scars and thyroid normal to palpation  RESP: lungs clear to auscultation - no rales, rhonchi or wheezes  CV: regular rate and rhythm, normal S1 S2, no S3 or S4, no murmur, click or rub, no peripheral edema and peripheral pulses strong  MS: no gross " musculoskeletal defects noted, no edema  SKIN: no suspicious lesions or rashes  NEURO: Normal strength and tone, mentation intact and speech normal  PSYCH: mentation appears normal, affect normal/bright    Diagnostic Test Results:  Labs reviewed in Epic    ASSESSMENT/PLAN:   (Z00.00) Routine general medical examination at a health care facility  (primary encounter diagnosis)  Comment: has donated blood in the past, has had Hep C and HIV screening at that time.  Plan: CBC with platelets and differential,         Comprehensive metabolic panel (BMP + Alb, Alk         Phos, ALT, AST, Total. Bili, TP), TSH with free        T4 reflex            (F43.23) Adjustment disorder with mixed anxiety and depressed mood  Comment: discussed next steps.  While she did check the box about thoughts of being dead, it is not something new for her, nor would it be anything she would act upon.  Feels safe at home.  No plan.  Discussed referral to psychiatry and/or psychology, but she does not feel she would have the time or funds to devote to it.  Discussed med changes such as adding wellbutrin to help with some inattention symptoms, but she prefers to change the lexapro.  Will decrease to 10 mg daily as she starts cymbalta.  Will check in with her in 5-7 days where I anticipate stopping the lexapro.  Plan: DULoxetine (CYMBALTA) 30 MG capsule            (E28.2) PCOS (polycystic ovarian syndrome)  Comment: stable  Plan: metFORMIN (GLUCOPHAGE-XR) 500 MG 24 hr tablet,         Hemoglobin A1c            (R41.840) Inattention  Comment: as above  Plan:     BMI 45-45.9  Discussed dietary and  Lifestyle adjustents.    (Z23) Need for vaccination  Comment:   Plan: TDAP VACCINE (Adacel, Boostrix)  [[id number]]            (Z13.6) CARDIOVASCULAR SCREENING; LDL GOAL LESS THAN 160  Comment:   Plan: Lipid panel reflex to direct LDL Fasting                  COUNSELING:  Reviewed preventive health counseling, as reflected in patient instructions       Regular  "exercise       Healthy diet/nutrition       Contraception    Estimated body mass index is 45.01 kg/m  as calculated from the following:    Height as of this encounter: 1.73 m (5' 8.11\").    Weight as of this encounter: 134.7 kg (297 lb).    Weight management plan: Discussed healthy diet and exercise guidelines    She reports that she has never smoked. She has never used smokeless tobacco.      Counseling Resources:  ATP IV Guidelines  Pooled Cohorts Equation Calculator  Breast Cancer Risk Calculator  BRCA-Related Cancer Risk Assessment: FHS-7 Tool  FRAX Risk Assessment  ICSI Preventive Guidelines  Dietary Guidelines for Americans, 2010  USDA's MyPlate  ASA Prophylaxis  Lung CA Screening    ALANNA [facility]  "

## 2022-02-28 ENCOUNTER — OFFICE VISIT (OUTPATIENT)
Dept: FAMILY MEDICINE | Facility: CLINIC | Age: 32
End: 2022-02-28
Payer: COMMERCIAL

## 2022-02-28 VITALS
SYSTOLIC BLOOD PRESSURE: 138 MMHG | OXYGEN SATURATION: 96 % | TEMPERATURE: 97.1 F | RESPIRATION RATE: 16 BRPM | HEIGHT: 68 IN | WEIGHT: 293 LBS | DIASTOLIC BLOOD PRESSURE: 95 MMHG | HEART RATE: 86 BPM | BODY MASS INDEX: 44.41 KG/M2

## 2022-02-28 DIAGNOSIS — R41.840 INATTENTION: ICD-10-CM

## 2022-02-28 DIAGNOSIS — Z13.6 CARDIOVASCULAR SCREENING; LDL GOAL LESS THAN 160: ICD-10-CM

## 2022-02-28 DIAGNOSIS — Z00.00 ROUTINE GENERAL MEDICAL EXAMINATION AT A HEALTH CARE FACILITY: Primary | ICD-10-CM

## 2022-02-28 DIAGNOSIS — E28.2 PCOS (POLYCYSTIC OVARIAN SYNDROME): ICD-10-CM

## 2022-02-28 DIAGNOSIS — F43.23 ADJUSTMENT DISORDER WITH MIXED ANXIETY AND DEPRESSED MOOD: ICD-10-CM

## 2022-02-28 DIAGNOSIS — Z23 NEED FOR VACCINATION: ICD-10-CM

## 2022-02-28 LAB
ALBUMIN SERPL-MCNC: 2.9 G/DL (ref 3.4–5)
ALP SERPL-CCNC: 68 U/L (ref 40–150)
ALT SERPL W P-5'-P-CCNC: 14 U/L (ref 0–50)
ANION GAP SERPL CALCULATED.3IONS-SCNC: 11 MMOL/L (ref 3–14)
AST SERPL W P-5'-P-CCNC: 11 U/L (ref 0–45)
BASOPHILS # BLD AUTO: 0.1 10E3/UL (ref 0–0.2)
BASOPHILS NFR BLD AUTO: 1 %
BILIRUB SERPL-MCNC: 0.4 MG/DL (ref 0.2–1.3)
BUN SERPL-MCNC: 8 MG/DL (ref 7–30)
CALCIUM SERPL-MCNC: 9 MG/DL (ref 8.5–10.1)
CHLORIDE BLD-SCNC: 113 MMOL/L (ref 94–109)
CHOLEST SERPL-MCNC: 191 MG/DL
CO2 SERPL-SCNC: 17 MMOL/L (ref 20–32)
CREAT SERPL-MCNC: 0.82 MG/DL (ref 0.52–1.04)
EOSINOPHIL # BLD AUTO: 0.1 10E3/UL (ref 0–0.7)
EOSINOPHIL NFR BLD AUTO: 1 %
ERYTHROCYTE [DISTWIDTH] IN BLOOD BY AUTOMATED COUNT: 13.1 % (ref 10–15)
FASTING STATUS PATIENT QL REPORTED: YES
GFR SERPL CREATININE-BSD FRML MDRD: >90 ML/MIN/1.73M2
GLUCOSE BLD-MCNC: 112 MG/DL (ref 70–99)
HBA1C MFR BLD: 5.6 % (ref 0–5.6)
HCT VFR BLD AUTO: 40.2 % (ref 35–47)
HDLC SERPL-MCNC: 36 MG/DL
HGB BLD-MCNC: 13.7 G/DL (ref 11.7–15.7)
LDLC SERPL CALC-MCNC: 106 MG/DL
LYMPHOCYTES # BLD AUTO: 3 10E3/UL (ref 0.8–5.3)
LYMPHOCYTES NFR BLD AUTO: 31 %
MCH RBC QN AUTO: 31 PG (ref 26.5–33)
MCHC RBC AUTO-ENTMCNC: 34.1 G/DL (ref 31.5–36.5)
MCV RBC AUTO: 91 FL (ref 78–100)
MONOCYTES # BLD AUTO: 0.4 10E3/UL (ref 0–1.3)
MONOCYTES NFR BLD AUTO: 4 %
NEUTROPHILS # BLD AUTO: 6 10E3/UL (ref 1.6–8.3)
NEUTROPHILS NFR BLD AUTO: 62 %
NONHDLC SERPL-MCNC: 155 MG/DL
PLATELET # BLD AUTO: 279 10E3/UL (ref 150–450)
POTASSIUM BLD-SCNC: 4.1 MMOL/L (ref 3.4–5.3)
PROT SERPL-MCNC: 7 G/DL (ref 6.8–8.8)
RBC # BLD AUTO: 4.42 10E6/UL (ref 3.8–5.2)
SODIUM SERPL-SCNC: 141 MMOL/L (ref 133–144)
TRIGL SERPL-MCNC: 246 MG/DL
TSH SERPL DL<=0.005 MIU/L-ACNC: 2.52 MU/L (ref 0.4–4)
WBC # BLD AUTO: 9.7 10E3/UL (ref 4–11)

## 2022-02-28 PROCEDURE — 90715 TDAP VACCINE 7 YRS/> IM: CPT | Performed by: PHYSICIAN ASSISTANT

## 2022-02-28 PROCEDURE — 83036 HEMOGLOBIN GLYCOSYLATED A1C: CPT | Performed by: PHYSICIAN ASSISTANT

## 2022-02-28 PROCEDURE — 90471 IMMUNIZATION ADMIN: CPT | Performed by: PHYSICIAN ASSISTANT

## 2022-02-28 PROCEDURE — 99395 PREV VISIT EST AGE 18-39: CPT | Mod: 25 | Performed by: PHYSICIAN ASSISTANT

## 2022-02-28 PROCEDURE — 99214 OFFICE O/P EST MOD 30 MIN: CPT | Mod: 25 | Performed by: PHYSICIAN ASSISTANT

## 2022-02-28 PROCEDURE — 36415 COLL VENOUS BLD VENIPUNCTURE: CPT | Performed by: PHYSICIAN ASSISTANT

## 2022-02-28 PROCEDURE — 80050 GENERAL HEALTH PANEL: CPT | Performed by: PHYSICIAN ASSISTANT

## 2022-02-28 PROCEDURE — 80061 LIPID PANEL: CPT | Performed by: PHYSICIAN ASSISTANT

## 2022-02-28 RX ORDER — METFORMIN HCL 500 MG
TABLET, EXTENDED RELEASE 24 HR ORAL
Qty: 90 TABLET | Refills: 0 | Status: SHIPPED | OUTPATIENT
Start: 2022-02-28 | End: 2022-06-03

## 2022-02-28 RX ORDER — DULOXETIN HYDROCHLORIDE 30 MG/1
30 CAPSULE, DELAYED RELEASE ORAL DAILY
Qty: 30 CAPSULE | Refills: 0 | Status: SHIPPED | OUTPATIENT
Start: 2022-02-28 | End: 2022-04-11

## 2022-02-28 ASSESSMENT — ENCOUNTER SYMPTOMS
HEARTBURN: 0
WEAKNESS: 0
HEMATOCHEZIA: 0
NAUSEA: 0
COUGH: 0
ABDOMINAL PAIN: 0
CONSTIPATION: 0
DIARRHEA: 0
PARESTHESIAS: 0
PALPITATIONS: 0
DIZZINESS: 0
MYALGIAS: 0
CHILLS: 0
NERVOUS/ANXIOUS: 1
FREQUENCY: 0
ARTHRALGIAS: 0
SHORTNESS OF BREATH: 0
EYE PAIN: 0
SORE THROAT: 0
BREAST MASS: 0
FEVER: 0
JOINT SWELLING: 0
HEADACHES: 0
HEMATURIA: 0

## 2022-02-28 ASSESSMENT — PATIENT HEALTH QUESTIONNAIRE - PHQ9: SUM OF ALL RESPONSES TO PHQ QUESTIONS 1-9: 11

## 2022-04-11 ENCOUNTER — MYC REFILL (OUTPATIENT)
Dept: FAMILY MEDICINE | Facility: CLINIC | Age: 32
End: 2022-04-11
Payer: COMMERCIAL

## 2022-04-11 DIAGNOSIS — F43.23 ADJUSTMENT DISORDER WITH MIXED ANXIETY AND DEPRESSED MOOD: ICD-10-CM

## 2022-04-11 RX ORDER — DULOXETIN HYDROCHLORIDE 30 MG/1
30 CAPSULE, DELAYED RELEASE ORAL DAILY
Qty: 30 CAPSULE | Refills: 0 | Status: CANCELLED | OUTPATIENT
Start: 2022-04-11

## 2022-04-25 NOTE — PROGRESS NOTES
Livia is a 31 year old who is being evaluated via a billable video visit.      How would you like to obtain your AVS? MyChart  If the video visit is dropped, the invitation should be resent by: Text to cell phone: [phone]  Will anyone else be joining your video visit? No    Video Start Time: 4:49 PM    Assessment & Plan     Adjustment disorder with mixed anxiety and depressed mood  Overall improved, would like to stay at this current dose for another 3 months.  - DULoxetine (CYMBALTA) 30 MG capsule; TAKE 1 CAPSULE(30 MG) BY MOUTH DAILY                 Return in about 3 months (around 7/28/2022) for e-visit/telephone.    Jhon Solomon PA-C  [facility]   Livia is a 31 year old who presents for the following health issues     History of Present Illness       Mental Health Follow-up:  Patient presents to follow-up on Depression & Anxiety.Patient's depression since last visit has been:  Better  The patient is not having other symptoms associated with depression.  Patient's anxiety since last visit has been:  Better  The patient is not having other symptoms associated with anxiety.  Any significant life events: No  Patient is not feeling anxious or having panic attacks.  Patient has no concerns about alcohol or drug use.       Today's PHQ-9         PHQ-9 Total Score: 9  PHQ-9 Q9 Thoughts of better off dead/self-harm past 2 weeks :   (P) Not at all    How difficult have these problems made it for you to do your work, take care of things at home, or get along with other people: Somewhat difficult    Today's PRISCILLA-7 Score: 9    She eats 2-3 servings of fruits and vegetables daily.She consumes 2 sweetened beverage(s) daily.She exercises with enough effort to increase her heart rate 20 to 29 minutes per day.  She exercises with enough effort to increase her heart rate 3 or less days per week. She is missing 3 dose(s) of medications per week.  She is not taking prescribed  medications regularly due to remembering to take.        Social History     Tobacco Use     Smoking status: Never Smoker     Smokeless tobacco: Never Used   Substance Use Topics     Alcohol use: Yes     Comment: once/month     Drug use: Yes     Types: Marijuana     Comment: weed every few weeks     PHQ 1/5/2021 2/25/2022 4/27/2022   PHQ-9 Total Score 12 11 9   Q9: Thoughts of better off dead/self-harm past 2 weeks Several days Several days Not at all   F/U: Thoughts of suicide or self-harm - No -   F/U: Safety concerns - No -     PRISCILLA-7 SCORE 8/28/2020 1/5/2021 4/27/2022   Total Score - - 9 (mild anxiety)   Total Score 13 4 9     Last PHQ-9 4/27/2022   1.  Little interest or pleasure in doing things 1   2.  Feeling down, depressed, or hopeless 0   3.  Trouble falling or staying asleep, or sleeping too much 1   4.  Feeling tired or having little energy 1   5.  Poor appetite or overeating 3   6.  Feeling bad about yourself 1   7.  Trouble concentrating 2   8.  Moving slowly or restless 0   Q9: Thoughts of better off dead/self-harm past 2 weeks 0   PHQ-9 Total Score 9   Difficulty at work, home, or with people -   In the past two weeks have you had thoughts of suicide or self harm? -   Do you have concerns about your personal safety or the safety of others? -     PRISCILLA-7  4/27/2022   1. Feeling nervous, anxious, or on edge 1   2. Not being able to stop or control worrying 1   3. Worrying too much about different things 1   4. Trouble relaxing 2   5. Being so restless that it is hard to sit still 2   6. Becoming easily annoyed or irritable 1   7. Feeling afraid, as if something awful might happen 1   PRISCILLA-7 Total Score 9   If you checked any problems, how difficult have they made it for you to do your work, take care of things at home, or get along with other people? -       Suicide Assessment Five-step Evaluation and Treatment (SAFE-T)            Review of Systems   Constitutional, HEENT, cardiovascular, pulmonary, gi and  gu systems are negative, except as otherwise noted.      Objective           Vitals:  No vitals were obtained today due to virtual visit.    Physical Exam   GENERAL: Healthy, alert and no distress  EYES: Eyes grossly normal to inspection.  No discharge or erythema, or obvious scleral/conjunctival abnormalities.  RESP: No audible wheeze, cough, or visible cyanosis.  No visible retractions or increased work of breathing.    SKIN: Visible skin clear. No significant rash, abnormal pigmentation or lesions.  NEURO: Cranial nerves grossly intact.  Mentation and speech appropriate for age.  PSYCH: Mentation appears normal, affect normal/bright, judgement and insight intact, normal speech and appearance well-groomed.                Video-Visit Details    Type of service:  Video Visit    Video End Time:4:58 PM    Originating Location (pt. Location): Home    Distant Location (provider location):  [facility]     Platform used for Video Visit: [employer]

## 2022-04-27 ASSESSMENT — ANXIETY QUESTIONNAIRES
7. FEELING AFRAID AS IF SOMETHING AWFUL MIGHT HAPPEN: SEVERAL DAYS
GAD7 TOTAL SCORE: 9
6. BECOMING EASILY ANNOYED OR IRRITABLE: SEVERAL DAYS
7. FEELING AFRAID AS IF SOMETHING AWFUL MIGHT HAPPEN: SEVERAL DAYS
3. WORRYING TOO MUCH ABOUT DIFFERENT THINGS: SEVERAL DAYS
GAD7 TOTAL SCORE: 9
4. TROUBLE RELAXING: MORE THAN HALF THE DAYS
1. FEELING NERVOUS, ANXIOUS, OR ON EDGE: SEVERAL DAYS
2. NOT BEING ABLE TO STOP OR CONTROL WORRYING: SEVERAL DAYS
GAD7 TOTAL SCORE: 9
5. BEING SO RESTLESS THAT IT IS HARD TO SIT STILL: MORE THAN HALF THE DAYS

## 2022-04-27 ASSESSMENT — PATIENT HEALTH QUESTIONNAIRE - PHQ9
SUM OF ALL RESPONSES TO PHQ QUESTIONS 1-9: 9
SUM OF ALL RESPONSES TO PHQ QUESTIONS 1-9: 9
10. IF YOU CHECKED OFF ANY PROBLEMS, HOW DIFFICULT HAVE THESE PROBLEMS MADE IT FOR YOU TO DO YOUR WORK, TAKE CARE OF THINGS AT HOME, OR GET ALONG WITH OTHER PEOPLE: SOMEWHAT DIFFICULT

## 2022-04-28 ENCOUNTER — VIRTUAL VISIT (OUTPATIENT)
Dept: FAMILY MEDICINE | Facility: CLINIC | Age: 32
End: 2022-04-28
Payer: COMMERCIAL

## 2022-04-28 DIAGNOSIS — F43.23 ADJUSTMENT DISORDER WITH MIXED ANXIETY AND DEPRESSED MOOD: ICD-10-CM

## 2022-04-28 PROCEDURE — 99213 OFFICE O/P EST LOW 20 MIN: CPT | Mod: 95 | Performed by: PHYSICIAN ASSISTANT

## 2022-04-28 RX ORDER — DULOXETIN HYDROCHLORIDE 30 MG/1
CAPSULE, DELAYED RELEASE ORAL
Qty: 90 CAPSULE | Refills: 1 | Status: SHIPPED | OUTPATIENT
Start: 2022-04-28 | End: 2022-11-18

## 2022-04-28 ASSESSMENT — ANXIETY QUESTIONNAIRES: GAD7 TOTAL SCORE: 9

## 2022-04-28 ASSESSMENT — PATIENT HEALTH QUESTIONNAIRE - PHQ9
SUM OF ALL RESPONSES TO PHQ QUESTIONS 1-9: 9
10. IF YOU CHECKED OFF ANY PROBLEMS, HOW DIFFICULT HAVE THESE PROBLEMS MADE IT FOR YOU TO DO YOUR WORK, TAKE CARE OF THINGS AT HOME, OR GET ALONG WITH OTHER PEOPLE: SOMEWHAT DIFFICULT

## 2022-05-01 ENCOUNTER — MYC MEDICAL ADVICE (OUTPATIENT)
Dept: FAMILY MEDICINE | Facility: CLINIC | Age: 32
End: 2022-05-01
Payer: COMMERCIAL

## 2022-05-01 DIAGNOSIS — R41.840 INATTENTION: Primary | ICD-10-CM

## 2022-06-25 ENCOUNTER — HEALTH MAINTENANCE LETTER (OUTPATIENT)
Age: 32
End: 2022-06-25

## 2022-07-26 DIAGNOSIS — E28.2 PCOS (POLYCYSTIC OVARIAN SYNDROME): ICD-10-CM

## 2022-07-27 RX ORDER — METFORMIN HCL 500 MG
TABLET, EXTENDED RELEASE 24 HR ORAL
Qty: 90 TABLET | Refills: 0 | Status: SHIPPED | OUTPATIENT
Start: 2022-07-27 | End: 2022-10-23

## 2022-11-04 ASSESSMENT — ANXIETY QUESTIONNAIRES
1. FEELING NERVOUS, ANXIOUS, OR ON EDGE: SEVERAL DAYS
IF YOU CHECKED OFF ANY PROBLEMS ON THIS QUESTIONNAIRE, HOW DIFFICULT HAVE THESE PROBLEMS MADE IT FOR YOU TO DO YOUR WORK, TAKE CARE OF THINGS AT HOME, OR GET ALONG WITH OTHER PEOPLE: SOMEWHAT DIFFICULT
4. TROUBLE RELAXING: NEARLY EVERY DAY
5. BEING SO RESTLESS THAT IT IS HARD TO SIT STILL: MORE THAN HALF THE DAYS
3. WORRYING TOO MUCH ABOUT DIFFERENT THINGS: SEVERAL DAYS
6. BECOMING EASILY ANNOYED OR IRRITABLE: SEVERAL DAYS
7. FEELING AFRAID AS IF SOMETHING AWFUL MIGHT HAPPEN: MORE THAN HALF THE DAYS
GAD7 TOTAL SCORE: 11
2. NOT BEING ABLE TO STOP OR CONTROL WORRYING: SEVERAL DAYS
7. FEELING AFRAID AS IF SOMETHING AWFUL MIGHT HAPPEN: MORE THAN HALF THE DAYS
8. IF YOU CHECKED OFF ANY PROBLEMS, HOW DIFFICULT HAVE THESE MADE IT FOR YOU TO DO YOUR WORK, TAKE CARE OF THINGS AT HOME, OR GET ALONG WITH OTHER PEOPLE?: SOMEWHAT DIFFICULT

## 2022-11-10 ENCOUNTER — VIRTUAL VISIT (OUTPATIENT)
Dept: PSYCHOLOGY | Facility: CLINIC | Age: 32
End: 2022-11-10
Attending: PHYSICIAN ASSISTANT
Payer: COMMERCIAL

## 2022-11-10 DIAGNOSIS — F41.9 ANXIETY: ICD-10-CM

## 2022-11-10 DIAGNOSIS — F88 DEVELOPMENTAL MENTAL DISORDER: Primary | ICD-10-CM

## 2022-11-10 PROCEDURE — 90834 PSYTX W PT 45 MINUTES: CPT | Mod: 95 | Performed by: PSYCHOLOGIST

## 2022-11-10 ASSESSMENT — COLUMBIA-SUICIDE SEVERITY RATING SCALE - C-SSRS
REASONS FOR IDEATION PAST MONTH: DOES NOT APPLY
2. HAVE YOU ACTUALLY HAD ANY THOUGHTS OF KILLING YOURSELF?: YES
TOTAL  NUMBER OF INTERRUPTED ATTEMPTS LIFETIME: NO
6. HAVE YOU EVER DONE ANYTHING, STARTED TO DO ANYTHING, OR PREPARED TO DO ANYTHING TO END YOUR LIFE?: NO
1. HAVE YOU WISHED YOU WERE DEAD OR WISHED YOU COULD GO TO SLEEP AND NOT WAKE UP?: YES
5. HAVE YOU STARTED TO WORK OUT OR WORKED OUT THE DETAILS OF HOW TO KILL YOURSELF? DO YOU INTEND TO CARRY OUT THIS PLAN?: NO
2. HAVE YOU ACTUALLY HAD ANY THOUGHTS OF KILLING YOURSELF?: NO
4. HAVE YOU HAD THESE THOUGHTS AND HAD SOME INTENTION OF ACTING ON THEM?: NO
1. IN THE PAST MONTH, HAVE YOU WISHED YOU WERE DEAD OR WISHED YOU COULD GO TO SLEEP AND NOT WAKE UP?: NO
3. HAVE YOU BEEN THINKING ABOUT HOW YOU MIGHT KILL YOURSELF?: NO
ATTEMPT LIFETIME: NO
TOTAL  NUMBER OF ABORTED OR SELF INTERRUPTED ATTEMPTS LIFETIME: NO

## 2022-11-10 ASSESSMENT — PATIENT HEALTH QUESTIONNAIRE - PHQ9: SUM OF ALL RESPONSES TO PHQ QUESTIONS 1-9: 11

## 2022-11-10 NOTE — PROGRESS NOTES
[facility]   Mental Health & Addiction Services     Progress Note - Initial Visit    Client Name:  Livia Fallon Date: November 10, 2022         Service Type: Consult Note     Visit Start Time: 9:00am  Visit End Time: 9:52am    Visit #: 1    Attendees: Client attended alone    Service Modality:  Video Visit:      Provider verified identity through the following two step process.  Patient provided:  Patient  and Patient address    Telemedicine Visit: The patient's condition can be safely assessed and treated via synchronous audio and visual telemedicine encounter.      Reason for Telemedicine Visit: Services only offered telehealth    Originating Site (Patient Location): Patient's home    Distant Site (Provider Location): [facility] MENT[facility] HEALTH & ADDI[facility]    Consent:  The patient/guardian has verbally consented to: the potential risks and benefits of telemedicine (video visit) versus in person care; bill my insurance or make self-payment for services provided; and responsibility for payment of non-covered services.     Patient would like the video invitation sent by:  Send to e-mail at: [email or web address]    Mode of Communication:  Video Conference via [employer]    Distant Location (Provider):  On-site    As the provider I attest to compliance with applicable laws and regulations related to telemedicine.       DATA:  Extended Session (53+ minutes): No  Interactive Complexity: No   Crisis: No     Presenting Concerns/Current Stressors:   Patient presented to session to initiate the ADHD evaluation process.       PHQ 2022   PHQ-9 Total Score 11 9 11   Q9: Thoughts of better off dead/self-harm past 2 weeks Several days Not at all Not at all   F/U: Thoughts of suicide or self-harm No - -   F/U: Safety concerns No - -        PRISCILLA-7 SCORE 2021   Total Score - 9 (mild anxiety) 11 (moderate anxiety)   Total  Score 4 9 11       ASSESSMENT:  Mental Status Assessment:  Appearance:   Appropriate   Eye Contact:   Good   Psychomotor Behavior: Normal   Attitude:   Cooperative   Orientation:   All  Speech   Rate / Production: Prosody    Volume:  Normal   Mood:    Normal  Affect:    Appropriate   Thought Content:  Clear   Thought Form:  Logical   Insight:    Good       Safety Issues and Plan for Safety and Risk Management:   Wallace Suicide Severity Rating Scale (Lifetime/Recent)  Wallace Suicide Severity Rating (Lifetime/Recent) 11/10/2022   1. Wish to be Dead (Lifetime) 1   Wish to be Dead Description (Lifetime) About 14-32 years old. No plan developed.   1. Wish to be Dead (Past 1 Month) 0   2. Non-Specific Active Suicidal Thoughts (Lifetime) 1   Non-Specific Active Suicidal Thought Description (Lifetime) About 3-4 years ago.   2. Non-Specific Active Suicidal Thoughts (Past 1 Month) 0   3. Active Suicidal Ideation with any Methods (Not Plan) Without Intent to Act (Lifetime) 0   4. Active Suicidal Ideation with Some Intent to Act, Without Specific Plan (Lifetime) 0   5. Active Suicidal Ideation with Specific Plan and Intent (Lifetime) 0   Reasons for Ideation (Past 1 Month) 0   Actual Attempt (Lifetime) 0   Has subject engaged in non-suicidal self-injurious behavior? (Lifetime) 0   Interrupted Attempts (Lifetime) 0   Aborted or Self-Interrupted Attempt (Lifetime) 0   Preparatory Acts or Behavior (Lifetime) 0   Calculated C-SSRS Risk Score (Lifetime/Recent) No Risk Indicated     Patient denies current fears or concerns for personal safety.  Patient denies current or recent suicidal ideation or behaviors.  Patient denies current or recent homicidal ideation or behaviors.  Patient denies current or recent self injurious behavior or ideation.  Patient denies other safety concerns.  Recommended that patient call 911 or go to the local ED should there be a change in any of these risk factors.    Diagnostic Criteria:  F88:  A. A  persistent pattern of inattention and/or hyperactivity-impulsivity that interferes with functioning or development, as characterized by (1) and/or (2):   1. Six or more inattention symptoms that have persisted for at least 6 months to a degree that is inconsistent with developmental level and that negatively impacts directly on social and academic/occupational activities.   2. Six or more hyperactivity and impulsivity symptoms that have persisted for at least 6 months to a degree that is inconsistent with developmental level and that negatively impacts directly on social and academic/occupational activities.  B. Several symptoms (inattentive or hyperactive/impulsive) were present before the age of 12 years.  C. Several symptoms (inattentive or hyperactive/impulsive) present in ?2 settings (eg, at home, school, or work; with friends or relatives; in other activities).  D. There is clear evidence that the symptoms interfere with or reduce the quality of social, academic, or occupational functioning.  E. Symptoms do not occur exclusively during the course of schizophrenia or another psychotic disorder, and are not better explained by another mental disorder (eg, mood disorder, anxiety disorder, dissociative disorder, personality disorder, substance intoxication, or withdrawal).    F41.9:  A. Excessive anxiety and worry, occurring more days than not for at least 6 months about a number of events or activities.   B. The individual finds it difficult to control the worry.  C. The anxiety and worry are associated with 3 or more of 6 symptoms.  D. The anxiety, worry, or physical symptoms cause clinically significant distress or impairment in social, occupational, or other important areas of functioning.  E. The disturbance is not attributable to the physiological effects of a substance (e.g., a drug of abuse, a medication) or another medical condition (e.g., hyperthyroidism).  F. The disturbance is not better explained by  "another mental disorder (e.g., anxiety or worry about having panic attacks in panic disorder, negative evaluation in social anxiety disorder [social phobia], contamination or other obsessions in obsessive-compulsive disorder, separation from attachment figures in separation anxiety disorder, reminders of traumatic events in posttraumatic stress disorder, gaining weight in anorexia nervosa, physical complaints in somatic symptom disorder, perceived appearance flaws in body dysmorphic disorder, having a serious illness in illness anxiety disorder, or the content of delusional beliefs in schizophrenia or delusional disorder).    DSM5 Diagnoses: (Sustained by DSM5 Criteria Listed Above)  Diagnoses:   1. Developmental mental disorder    2. Anxiety    Rule out depression versus bipolar disorder  Psychosocial & Contextual Factors: Social support, employed  WHODAS 2.0 (12 item):   WHODAS 2.0 Total Score 11/4/2022   Total Score 40   Total Score MyChart 40     Intervention:              Reviewed symptoms and history of presenting concern. Patient endorsed symptoms consistent with depression , anxiety , donavan and ADHD. Patient denied symptoms associated with panic, OCD and perceptual difficulties.  Patient reported some possible concerns for autism spectrum disorder.  Indicated that I can provide referrals for this assessment if she is wanting to pursue this.  Patient reported always feeling \"weird\" and \"out of the loop.\"  Some oddities and prosody noted.  Unable to complete diagnostic intake, will be completed in next session.  CBT: socratic questioning, positive reinforcement  EFT: empathetic attunement, emotion checking, emotion naming  MI: open ended questions, affirmations, reflections        Attendance Agreement:  Client has not signed the attendance agreement. Discussed expectations at beginning of this first session and patient agreed.       PLAN:  Provider will continue Diagnostic Assessment in next session. Patient " will complete Ruiz questionnaires  and CNS Vital Signs prior to next session (11/17/2022).    Patient meets the following risk assessment and triage: Patient denied any current/recent/lifetime history of suicidal ideation and/or behaviors.  No safety plan indicated at this time.     Medical necessity criteria is warranted in order to: Measure a psychological disorder and its severity and functional impairment to determine psychiatric diagnosis when a mental illness is suspected, or to achieve a differential diagnosis from a range of medical/psychological disorders that present with similar constellations of symptoms (e.g., determination and measurement of anxiety severity and impact in the presence of ongoing asthma or heart disease), Perform symptom measurement to objectively measure treatment effectiveness and/or determine the need to refer for pharmacological treatment or other medical evaluation (e.g., based on severity and chronicity of symptoms) and Evaluate primary symptoms of impaired attention and concentration that can occur in many neurological and psychiatric conditions.    Medical necessity for psychological assessment is warranted as a result of the following: (1) A specific clinical question is posed that relates to the condition/symptoms being addressed (2) The question cannot be adequately addressed by clinical interview and/or behavioral observation (3) Results of psychological testing are reasonably expected to provide an answer to the query (4) It is reasonably expected that the testing will provide information leading to a clearer diagnosis and/or guide treatment planning with an expectation of improved clinical outcome.    I acknowledge that, based upon current clinical information, the patient and I have reviewed and discussed issues pertaining to the purpose of therapy/testing, potential therapeutic goals, procedures, risks and benefits, and estimated duration of therapy/testing. Issues  pertaining to fees/insurance and confidentiality were also addressed with the patient, who indicated understanding and elected to continue with appointments. I will not be providing any experimental procedures and, if we agree that a change in clinical procedure would be more beneficial, I will obtain specific consent for that procedure or refer you to another provider who has expertise in that area.       Leann Rivera PsyD, LP  Clinical Psychologist

## 2022-11-17 ENCOUNTER — VIRTUAL VISIT (OUTPATIENT)
Dept: PSYCHOLOGY | Facility: CLINIC | Age: 32
End: 2022-11-17
Payer: COMMERCIAL

## 2022-11-17 DIAGNOSIS — F88 DEVELOPMENTAL MENTAL DISORDER: Primary | ICD-10-CM

## 2022-11-17 DIAGNOSIS — F41.9 ANXIETY: ICD-10-CM

## 2022-11-17 PROCEDURE — 90791 PSYCH DIAGNOSTIC EVALUATION: CPT | Mod: 95 | Performed by: PSYCHOLOGIST
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
## 2022-11-20 ENCOUNTER — HEALTH MAINTENANCE LETTER (OUTPATIENT)
Age: 32
End: 2022-11-20

## 2022-11-28 ASSESSMENT — ANXIETY QUESTIONNAIRES
3. WORRYING TOO MUCH ABOUT DIFFERENT THINGS: SEVERAL DAYS
7. FEELING AFRAID AS IF SOMETHING AWFUL MIGHT HAPPEN: MORE THAN HALF THE DAYS
5. BEING SO RESTLESS THAT IT IS HARD TO SIT STILL: SEVERAL DAYS
GAD7 TOTAL SCORE: 9
1. FEELING NERVOUS, ANXIOUS, OR ON EDGE: SEVERAL DAYS
7. FEELING AFRAID AS IF SOMETHING AWFUL MIGHT HAPPEN: MORE THAN HALF THE DAYS
GAD7 TOTAL SCORE: 9
GAD7 TOTAL SCORE: 9
4. TROUBLE RELAXING: SEVERAL DAYS
IF YOU CHECKED OFF ANY PROBLEMS ON THIS QUESTIONNAIRE, HOW DIFFICULT HAVE THESE PROBLEMS MADE IT FOR YOU TO DO YOUR WORK, TAKE CARE OF THINGS AT HOME, OR GET ALONG WITH OTHER PEOPLE: SOMEWHAT DIFFICULT
8. IF YOU CHECKED OFF ANY PROBLEMS, HOW DIFFICULT HAVE THESE MADE IT FOR YOU TO DO YOUR WORK, TAKE CARE OF THINGS AT HOME, OR GET ALONG WITH OTHER PEOPLE?: SOMEWHAT DIFFICULT
6. BECOMING EASILY ANNOYED OR IRRITABLE: MORE THAN HALF THE DAYS
2. NOT BEING ABLE TO STOP OR CONTROL WORRYING: SEVERAL DAYS

## 2022-11-29 ENCOUNTER — VIRTUAL VISIT (OUTPATIENT)
Dept: FAMILY MEDICINE | Facility: CLINIC | Age: 32
End: 2022-11-29
Payer: COMMERCIAL

## 2022-11-29 DIAGNOSIS — E28.2 PCOS (POLYCYSTIC OVARIAN SYNDROME): ICD-10-CM

## 2022-11-29 DIAGNOSIS — F43.23 ADJUSTMENT DISORDER WITH MIXED ANXIETY AND DEPRESSED MOOD: Primary | ICD-10-CM

## 2022-11-29 DIAGNOSIS — E66.01 MORBID OBESITY (H): ICD-10-CM

## 2022-11-29 PROCEDURE — 99214 OFFICE O/P EST MOD 30 MIN: CPT | Mod: 95 | Performed by: PHYSICIAN ASSISTANT

## 2022-11-29 RX ORDER — DULOXETIN HYDROCHLORIDE 30 MG/1
30 CAPSULE, DELAYED RELEASE ORAL DAILY
Qty: 90 CAPSULE | Refills: 3 | Status: SHIPPED | OUTPATIENT
Start: 2022-11-29 | End: 2023-11-01 | Stop reason: DRUGHIGH

## 2022-11-29 RX ORDER — ACETAMINOPHEN AND CODEINE PHOSPHATE 120; 12 MG/5ML; MG/5ML
SOLUTION ORAL
COMMUNITY
Start: 2022-07-01 | End: 2022-12-22

## 2022-11-29 NOTE — PROGRESS NOTES
Livia is a 32 year old who is being evaluated via a billable video visit.      How would you like to obtain your AVS? MyChart  If the video visit is dropped, the invitation should be resent by:   Will anyone else be joining your video visit? No Answers for HPI/ROS submitted by the patient on 11/28/2022  If you checked off any problems, how difficult have these problems made it for you to do your work, take care of things at home, or get along with other people?: Somewhat difficult  PHQ9 TOTAL SCORE: 9  PRISCILLA 7 TOTAL SCORE: 9  Depression/Anxiety: Depression & Anxiety  Status since last visit:: good  Anxiety since last: : good  Other associated symptoms of depression:: No  Other associated symotome: : No  Significant life event: : relationship concerns  Anxious:: No  Current substance use:: No  How many servings of fruits and vegetables do you eat daily?: 2-3  On average, how many sweetened beverages do you drink each day (Examples: soda, juice, sweet tea, etc.  Do NOT count diet or artificially sweetened beverages)?: 2  How many minutes a day do you exercise enough to make your heart beat faster?: 10 to 19  How many days a week do you exercise enough to make your heart beat faster?: 3 or less  How many days per week do you miss taking your medication?: 2  What makes it hard for you to take your medication every day?: remembering to take            Assessment & Plan     Adjustment disorder with mixed anxiety and depressed mood  Doing well, wishes to continue dose.  Was thinking about lowered, but with winter and some familial stress, she wishes to stay on this dose for now, and possible look at taper in the spring.  Continues with counseling.  - DULoxetine (CYMBALTA) 30 MG capsule; Take 1 capsule (30 mg) by mouth daily    PCOS (polycystic ovarian syndrome)  Recent change to hormones, not taking nuvaring, and taking oral Lyleq continuously.  Continues on metformin, recent refill sent.  Will update labs in the  "spring    Morbid obesity (H)  Stable, no recent adjustments noted.               BMI:   Estimated body mass index is 45.01 kg/m  as calculated from the following:    Height as of 2/28/22: 1.73 m (5' 8.11\").    Weight as of 2/28/22: 134.7 kg (297 lb).         No follow-ups on file.    Jhon Solomon PA-C  [facility]    Claire Bhakta is a 32 year old, presenting for the following health issues:  No chief complaint on file.      HPI     Depression and Anxiety Follow-Up    How are you doing with your depression since your last visit? Improved     How are you doing with your anxiety since your last visit?  Improved     Are you having other symptoms that might be associated with depression or anxiety? No    Have you had a significant life event? No     Do you have any concerns with your use of alcohol or other drugs? No    Social History     Tobacco Use     Smoking status: Never     Smokeless tobacco: Never   Substance Use Topics     Alcohol use: Yes     Comment: once/month     Drug use: Yes     Types: Marijuana     Comment: weed every few weeks     PHQ 4/27/2022 11/4/2022 11/28/2022   PHQ-9 Total Score 9 11 9   Q9: Thoughts of better off dead/self-harm past 2 weeks Not at all Not at all Not at all   F/U: Thoughts of suicide or self-harm - - -   F/U: Safety concerns - - -     PRISCILLA-7 SCORE 4/27/2022 11/4/2022 11/28/2022   Total Score 9 (mild anxiety) 11 (moderate anxiety) 9 (mild anxiety)   Total Score 9 11 9         Suicide Assessment Five-step Evaluation and Treatment (SAFE-T)    [facility] and started Lyleq every day.  Was told this was better for her PCOS and possible her headaches.  She is doing well with that so far.    Continues metformin for PCOS, does usually only take 1000 mg a day due to GI symptoms.      Review of Systems   Constitutional, HEENT, cardiovascular, pulmonary, gi and gu systems are negative, except as otherwise noted.    "   Objective           Vitals:  No vitals were obtained today due to virtual visit.    Physical Exam   GENERAL: Healthy, alert and no distress  EYES: Eyes grossly normal to inspection.  No discharge or erythema, or obvious scleral/conjunctival abnormalities.  RESP: No audible wheeze, cough, or visible cyanosis.  No visible retractions or increased work of breathing.    SKIN: Visible skin clear. No significant rash, abnormal pigmentation or lesions.  NEURO: Cranial nerves grossly intact.  Mentation and speech appropriate for age.  PSYCH: Mentation appears normal, affect normal/bright, judgement and insight intact, normal speech and appearance well-groomed.                Video-Visit Details    Video Start Time: 9:31 AM    Type of service:  Video Visit    Video End Time:9:43 AM    Originating Location (pt. Location): Home    Distant Location (provider location):  On-site    Platform used for Video Visit: Sukhwinder

## 2022-12-20 ENCOUNTER — VIRTUAL VISIT (OUTPATIENT)
Dept: PSYCHOLOGY | Facility: CLINIC | Age: 32
End: 2022-12-20
Payer: COMMERCIAL

## 2022-12-20 DIAGNOSIS — F41.9 ANXIETY: Primary | ICD-10-CM

## 2022-12-20 DIAGNOSIS — F31.81 BIPOLAR II DISORDER (H): ICD-10-CM

## 2022-12-20 PROCEDURE — 96130 PSYCL TST EVAL PHYS/QHP 1ST: CPT | Mod: 95 | Performed by: PSYCHOLOGIST

## 2022-12-20 PROCEDURE — 96131 PSYCL TST EVAL PHYS/QHP EA: CPT | Mod: 95 | Performed by: PSYCHOLOGIST

## 2022-12-20 NOTE — Clinical Note
Hello,  I wanted to let you know that I have completed the ADHD assessment with this patient.  As you will see the report, data do not support an ADHD diagnosis. I recommended Autism testing because I think that diagnosis is more relevant.  Please let me know if you have any questions or concerns!  Thanks!   Leann

## 2022-12-20 NOTE — PROGRESS NOTES
[facility]   Mental Health & Addiction Services     Progress Note - ADHD Feedback Session     Patient Name: Livia Fallon  Date: 2022       Service Type:  Individual       Session Start Time: 3:00pm  Session End Time:  3:40pm     Session Length: 40 minutes    Session #: 3    Attendees: Patient attended alone    Service Modality: Video Visit:      Provider verified identity through the following two step process.  Patient provided:  Patient  and Patient address    Telemedicine Visit: The patient's condition can be safely assessed and treated via synchronous audio and visual telemedicine encounter.      Reason for Telemedicine Visit: Services only offered telehealth    Originating Site (Patient Location): Patient's home    Distant Site (Provider Location): Provider Remote Setting- Home Office    Consent:  The patient/guardian has verbally consented to: the potential risks and benefits of telemedicine (video visit) versus in person care; bill my insurance or make self-payment for services provided; and responsibility for payment of non-covered services.     Patient would like the video invitation sent by:  Send to e-mail at: [email or web address]    Mode of Communication:  Video Conference via Amwell    Distant Location (Provider):  Off-site    As the provider I attest to compliance with applicable laws and regulations related to telemedicine.      PHQ 2022   PHQ-9 Total Score 9 11 9   Q9: Thoughts of better off dead/self-harm past 2 weeks Not at all Not at all Not at all   F/U: Thoughts of suicide or self-harm - - -   F/U: Safety concerns - - -       PRISCILLA-7 SCORE 2022   Total Score 9 (mild anxiety) 11 (moderate anxiety) 9 (mild anxiety)   Total Score 9 11 9         DATA      Progress Since Last Session (Related to Symptoms / Goals / Homework):   Symptoms: Stable.    Homework:  Completed.      Treatment Objective(s) Addressed in This Session:   Provided feedback on ADHD evaluation. Reviewed test results in depth. Plan of care and recommendations were discussed based on testing data. See full report attached on secondary note in this encounter.     Intervention:   Provided feedback to patient regarding testing results, diagnoses, and treatment recommendations. Test results are not consistent with an ADHD diagnosis. Symptoms are better explained by anxiety, bipolar II, and probably ASD. Personalized suggestions regarding symptoms were offered. Patient had the opportunity to ask questions; she expressed understanding.        ASSESSMENT: Current Emotional / Mental Status (status of significant symptoms):   Risk status (Self / Other harm or suicidal ideation)   Patient denies current fears or concerns for personal safety.   Patient denies current or recent suicidal ideation or behaviors.   Patient denies current or recent homicidal ideation or behaviors.   Patient denies current or recent self injurious behavior or ideation.   Patient denies other safety concerns.   Patient reports there has been no change in risk factors since their last session.     Patient reports there has been no change in protective factors since their last session.     Recommended that patient call 911 or go to the local ED should there be a change in any of these risk factors.     Appearance:   Appropriate    Eye Contact:   Good    Psychomotor Behavior: Normal    Attitude:   Cooperative    Orientation:   All   Speech    Rate / Production: Normal     Volume:  Normal    Mood:    Normal   Affect:    Appropriate    Thought Content:  Clear    Thought Form:  Coherent  Logical    Insight:    Good      Medication Review:   No changes to current psychiatric medication(s)     Medication Compliance:   Yes     Changes in Health Issues:   None reported     Chemical Use Review:   Substance Use: See report.      Nicotine Use: No  current tobacco use.      Diagnosis:  1. Anxiety    2. Bipolar II disorder (H)          PLAN:   Recommendations are outlined in full evaluation report (attached to this encounter).   Patient indicated understanding and will contact the clinic if there are further questions.    Report routed to referring provider.    Parts of this documentation may have been completed using dictation software. Potential errors may result and are unintentional.       Leann Rivera PsyD, LP  Clinical Psychologist         Psychological Testing Services Summary       Testing Evaluation Services Base: 22741  (first 60 mins) Add-on: 19642  (each addtl 60 mins)   Record Review and Clarify Referral Question   8:50am-9:00am on 11/10/2022 10 minutes   Clinical Decision Making/Battery Modification   7:45am-8:00am on 11/17/2022 15 minutes   Integration/Report Generation   12:00pm-1:00pm on 12/19/2022 ([facility])  1:00pm-1:30pm on 12/19/2022 ([facility])  1:30pm-1:45pm on 12/19/2022 ([facility]-2)  5:00pm-5:05pm on 12/19/2022 (Final Report)   60 minutes  30 minutes  15 minutes  65 minutes   Interactive Feedback Session   3:00pm-3:40pm on 12/20/2022 40 minutes   Post-Service Work   3:40pm-3:55pm on 12/20/2022 15 minutes   Total Time: 250 minutes   Total Units: 1 3       Diagnoses:   F41.9 Unspecified Anxiety Disorder  F31.81 Bipolar II Disorder  Rule out autism spectrum disorder

## 2022-12-20 NOTE — PATIENT INSTRUCTIONS
Manoj Bhakta,    Here are the ASD referrals we discussed:    [facility]: [email or web address]  [facility]: [email or web address]  [facility] [email or web address]  [facility] [email or web address]  [facility] [email or web address]   [facility] [email or web address]     As well as book resources:  Norman Melchor. ADHD in Adults. (2008).  Norman Melchor. Taking Charge of Adult ADHD. (2010).  Uriel Huffman and Jason Villalba. Delivered from Distraction. (2006).  Uriel Huffman and Jason Villalba. Driven to Distraction. (2011).  Yoli Sy. ADD in the Workplace. (1997).  Yoli Sy. ADD-Friendly Ways to Organize Your Life. (2002).  Aye Ma. The ADHD Effect on Marriage: Understand and Rebuild Your Relationship in Six Steps. (2010).  Kristen Lee and Fiorella Fam. You Mean I m Not Lazy, Stupid or Crazy? (2006).  Jose Ohara. Understand Your Brain, Get More Done: The ADHD Executive Functions Workbook. (2012).      Take care!  Leann Rivera PsyD,   Clinical Psychologist

## 2022-12-20 NOTE — PROGRESS NOTES
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 understand group dynamics.  See recommendations and autism spectrum disorder referrals provided below.    Referral Question Response: DSM-5 criteria for ADHD:   A. Symptom Count - Are there sufficient symptoms for the diagnosis?  Unclear, patient did not endorse sufficient symptoms during the clinical interview but endorsed sufficient hyperactivity symptoms on Ruiz measures.   B. Onset - Were several symptoms present before 11 years of age?  Yes, symptoms have been present most of her life.   C. Pervasiveness - Are several symptoms present in at least two settings? Yes, patient reported that symptoms are problematic at home and work.   D. Impairment - Do symptoms interfere with or reduce the quality of functioning? Yes, patient is unable to complete daily and work tasks effectively.   E. Exclusions - Are symptoms better explained by another disorder or factor?  Unclear, symptoms may be better explained by other mental health symptoms.  CNS Vital Signs results indicate difficulties are not explained by an organic basis of inattention.  However, other neurodevelopmental explanations for the problems that she did exhibit are possible.    The patient also meets the following DSM-5 criteria for bipolar II disorder:  A. Criteria have been met for at least 1 hypomanic episode.  B. " There has never been a manic episode.  C. The occurrence of the hypomanic and major depressive episodes are not better explained by schizoaffective disorder, schizophrenia, schizophreniform disorder, delusional disorder, or other specified or unspecified schizophrenia spectrum or other psychotic disorder.  D. The symptoms of depression or the unpredictability caused by frequent alternation between periods of depression and hypomania causes clinically significant distress or impairment in social, occupational, or other important areas of functioning.   Hypomanic Episode  A.  A distinct period of abnormally and persistently elevated, expansive, or irritable mood and abnormally and persistently increased activity or energy, lasting at least 4 consecutive days and present most of the day, nearly every day (or any duration if hospitalization is necessary).  B.  During the period of mood disturbance and increased energy or activity, 3 (or more) of the following symptoms (4 if the mood is only irritable) are present to a significant degree and represent a noticeable change from usual behavior:              1.  Inflated self-esteem or grandiosity.              2.  Decreased need for sleep.              3.  More talkative than usual or pressure to keep talking.              4.  Flight of ideas or subjective experience that thoughts are racing.              5.  Distractibility, as reported or observed.  6.  Increase in goal-directed activity (either socially, at work or school, or sexually) or psychomotor agitation.  C. The episode is associated with an unequivocal change in functioning that is uncharacteristic of the individual when not symptomatic.     D. The disturbance in mood and the change in functioning are observable by others.   E. The episode is not severe enough to cause marked impairment in social or occupational functioning or to necessitate hospitalization.   F.  The episode is not attributable to the  physiological effects of a substance or another medical condition.  Major Depressive Episode  A. Five (or more) symptoms have been present during the same 2-week period and represent a change from previous functioning; at least one of the symptoms is either (1) depressed mood or (2) loss of interest or pleasure.   1. Depressed mood.   2. Diminished interest or pleasure in all, or almost all, activities.   3. Significant appetite change.  4. Significant sleep problems.   5. Psychomotor agitation or retardation.   6. Fatigue or loss of energy.   7. Feelings of worthlessness or inappropriate guilt.   B. The symptoms cause clinically significant distress or impairment in social, occupational, or other important areas of functioning.  C. The episode is not attributable to the physiological effects of a substance or to another medical condition.    The patient meets the following DSM-5 criteria for unspecified anxiety disorder:  A. Excessive anxiety and worry, occurring more days than not for at least 6 months about a number of events or activities.   B. The individual finds it difficult to control the worry.  C. The anxiety and worry are associated with 3 or more of 6 symptoms.  D. The anxiety, worry, or physical symptoms cause clinically significant distress or impairment in social, occupational, or other important areas of functioning.  E. The disturbance is not attributable to the physiological effects of a substance (e.g., a drug of abuse, a medication) or another medical condition (e.g., hyperthyroidism).  F. The disturbance is not better explained by another mental disorder.    DIAGNOSES:  F41.9 Unspecified Anxiety Disorder  F31.81 Bipolar II Disorder  Rule out autism spectrum disorder    PLAN OF CARE:  1. Discuss the following with your primary care provider:  a. Discuss whether a referral for psychiatry may be appropriate, the patient is likely needing a medication option to stabilize her mood.    2. Consider  initiating individual psychotherapy to help alleviate mood symptoms. Research indicates that outcomes are best with both medication and therapy. You can call the [facility] line at [phone].     3. Referral for autism spectrum disorder testing:  [facility]: [email or web address]  [facility]: [email or web address]  [facility] [email or web address]  [facility] [email or web address]  [facility] [email or web address]   [facility] [email or web address]       RECOMMENDATIONS:  1. Due to the patient's reported attention, concentration, and mood difficulties, the following health/lifestyle changes when combined, can significantly improve symptoms:   a. Avoid simple carbohydrates at breakfast. Aim for only complex carbohydrates and lean protein for your morning meal.   b. Engage in aerobic exercise 3 times per week for 30 minutes, ensuring that your heart rate stays within your training zone. Further, reading the book,  Spark,  by Jason Villalba M.D can help the patient understand the benefits of exercise on the brain.   c. Research suggest that taking a high-quality multi-vitamin and antioxidant (1/2 cup of blueberries) daily in conjunction with balanced nutrition can be helpful.  d. Aim for the high end of daily water intake: around 72 ounces per day.  e. Ensure regular meals and snacks to maintain optimal attention.    2. The following may be beneficial in managing some of the patient's attention and concentration difficulties:  a. Due to the patient's difficulties with attention and concentration, consider working in a completely distraction-free area while completing tasks. Workspaces should be completely clear except for the materials needed for the current task. Both visual and auditory distractions should be decreased as much as  "possible.  b. Considering decreased ability to focus and maintain attention, it is recommended that the patient take frequent breaks while completing tasks. This will help to maintain attention and effort. The patient may benefit from the use of a [employer] Timer. The timer works by using built-in break times. After working on a task consistently for 25 minutes, the timer reminds the user to take a five-minute break before continuing, etc. A [employer] timer can be downloaded as a free solis to a phone or tablet.  c. Due to the patient's attentional and concentration symptoms, it is recommended to increase organization with the use of lists and calendars. Significantly increasing structure to the day and adhering to a set schedule can increase your ability to complete responsibilities, track deadline, etc. Breaking these tasks down into their component parts and recording them in a calendar/planner will likely be beneficial. Patient would benefit from setting feasible timelines for completion of activities. By establishing clear priorities for completing tasks, you can more likely complete the most important tasks first. The patient may also choose to elect to a friend or family member to help hold them accountable.    3. Avoid multitasking. Attempting to work on multiple tasks and projects the same increases the likelihood that an error will occur. Focus on one task at a time.    4. The patient may benefit from engaging in mindfulness practices. This may include breathing techniques, apps that provide guided meditation, or more interactive activities such as coloring.    5. Develop a \"coping skills jar/box.\" This entails designating a certain container to hold slips of paper with distraction technique ideas written on each slip of paper. Distraction techniques may include listening to a certain type of music, playing on game on your phone, doing a breathing exercise, spending time with a pet, calling a certain " individual, looking at a magazine, working on a puzzle, etc. When feeling distressed, choose a slip of paper from the container and engage in that activity rather than focusing on the problem.      Leann Rivera PsyD,   Clinical Psychologist

## 2023-03-09 ENCOUNTER — MYC REFILL (OUTPATIENT)
Dept: FAMILY MEDICINE | Facility: CLINIC | Age: 33
End: 2023-03-09
Payer: COMMERCIAL

## 2023-03-09 DIAGNOSIS — E28.2 PCOS (POLYCYSTIC OVARIAN SYNDROME): ICD-10-CM

## 2023-03-10 RX ORDER — METFORMIN HCL 500 MG
TABLET, EXTENDED RELEASE 24 HR ORAL
Qty: 90 TABLET | Refills: 0 | Status: SHIPPED | OUTPATIENT
Start: 2023-03-10 | End: 2023-04-07

## 2023-03-10 NOTE — TELEPHONE ENCOUNTER
Prescription approved per [facility] Refill Protocol.  1 refill only; patient due for appointment (Physical)    Raffi MOULTON RN

## 2023-04-04 ENCOUNTER — MYC MEDICAL ADVICE (OUTPATIENT)
Dept: FAMILY MEDICINE | Facility: CLINIC | Age: 33
End: 2023-04-04
Payer: COMMERCIAL

## 2023-04-04 DIAGNOSIS — E28.2 PCOS (POLYCYSTIC OVARIAN SYNDROME): ICD-10-CM

## 2023-04-07 RX ORDER — METFORMIN HCL 500 MG
1000 TABLET, EXTENDED RELEASE 24 HR ORAL
Qty: 180 TABLET | Refills: 1 | Status: SHIPPED | OUTPATIENT
Start: 2023-04-07 | End: 2023-11-21

## 2023-04-15 ENCOUNTER — HEALTH MAINTENANCE LETTER (OUTPATIENT)
Age: 33
End: 2023-04-15

## 2023-08-03 ENCOUNTER — MYC MEDICAL ADVICE (OUTPATIENT)
Dept: FAMILY MEDICINE | Facility: CLINIC | Age: 33
End: 2023-08-03
Payer: COMMERCIAL

## 2023-09-16 ENCOUNTER — HEALTH MAINTENANCE LETTER (OUTPATIENT)
Age: 33
End: 2023-09-16

## 2023-11-01 ENCOUNTER — VIRTUAL VISIT (OUTPATIENT)
Dept: INTERNAL MEDICINE | Facility: CLINIC | Age: 33
End: 2023-11-01
Payer: COMMERCIAL

## 2023-11-01 DIAGNOSIS — B97.89 VIRAL RESPIRATORY INFECTION: Primary | ICD-10-CM

## 2023-11-01 DIAGNOSIS — F32.4 MAJOR DEPRESSIVE DISORDER WITH SINGLE EPISODE, IN PARTIAL REMISSION (H): ICD-10-CM

## 2023-11-01 DIAGNOSIS — F41.1 GAD (GENERALIZED ANXIETY DISORDER): ICD-10-CM

## 2023-11-01 DIAGNOSIS — F43.23 ADJUSTMENT DISORDER WITH MIXED ANXIETY AND DEPRESSED MOOD: ICD-10-CM

## 2023-11-01 DIAGNOSIS — J98.8 VIRAL RESPIRATORY INFECTION: Primary | ICD-10-CM

## 2023-11-01 PROBLEM — F32.9 MAJOR DEPRESSION, SINGLE EPISODE: Status: ACTIVE | Noted: 2023-11-01

## 2023-11-01 PROCEDURE — 99203 OFFICE O/P NEW LOW 30 MIN: CPT | Mod: VID | Performed by: INTERNAL MEDICINE

## 2023-11-01 RX ORDER — DULOXETIN HYDROCHLORIDE 20 MG/1
20 CAPSULE, DELAYED RELEASE ORAL DAILY
Qty: 90 CAPSULE | Refills: 3 | Status: SHIPPED | OUTPATIENT
Start: 2023-11-01 | End: 2024-06-03 | Stop reason: DRUGHIGH

## 2023-11-01 NOTE — PATIENT INSTRUCTIONS
Work note-okay to return to work November 3    Onset of illness October 24    Flu shot when feeling better    Advise against RSV and COVID shot given age    Decrease duloxetine to 20 mg daily

## 2023-11-01 NOTE — LETTER
[facility]  [address] [place] [address]  [phone]  Dept: [phone]      11/1/2023    Re: Livia Fallon      TO WHOM IT MAY CONCERN:    Livia Fallon was seen on November 1, 2023.  Please excuse her until November 3, 2023 due to illness.  She has been ill from October 24, 2023 until now, but is improving.    Cordially          Noé Murray MD  [facility]

## 2023-11-01 NOTE — PROGRESS NOTES
Livia is a 33 year old female contacting the clinic today via video, who will use the platform: [employer] for the visit.  Phone # for Doximity, or if Amwell drops:   Telephone Information:   Mobile [phone]          ASSESSMENT and PLAN:  1. Viral respiratory infection  Improving.  Suggest returning to work November 3.  Work note provided    2. Adjustment disorder with mixed anxiety and depressed mood  Okay to decrease duloxetine before stopping.  - DULoxetine (CYMBALTA) 20 MG capsule; Take 1 capsule (20 mg) by mouth daily  Dispense: 90 capsule; Refill: 3    3. Major depressive disorder with single episode, in partial remission (H24)  Decrease trial of 20 mg  - DULoxetine (CYMBALTA) 20 MG capsule; Take 1 capsule (20 mg) by mouth daily  Dispense: 90 capsule; Refill: 3    4. PRISCILLA (generalized anxiety disorder)  As above  - DULoxetine (CYMBALTA) 20 MG capsule; Take 1 capsule (20 mg) by mouth daily  Dispense: 90 capsule; Refill: 3       Patient Instructions   Work note-okay to return to work November 3    Onset of illness October 24    Flu shot when feeling better    Advise against RSV and COVID shot given age    Decrease duloxetine to 20 mg daily            Return if symptoms worsen or fail to improve, for using a video visit.       CHIEF COMPLAINT:  Chief Complaint   Patient presents with    Consult     Needs a note for missing work.           11/1/2023     2:45 PM   Additional Questions   Roomed by Sherine ZAVALA       HISTORY OF PRESENT ILLNESS:  Livia is a 33 year old female contacting the clinic today via video for request for work note.  She became ill on October 24 with a viral syndrome.  She complains of aches, fever and chills, vertigo, sore throat, green phlegm and mucus.  All symptoms are improved but she attempted to return to work yesterday and vomited.  She made it to the car this morning then turned around.  She wonders whether she should attempt to return to work on November 2 or November 3.  She did not test  "for COVID.  Overall she is improving    Takes duloxetine for anxiety and depression.  Feels that her life is improved and would like to decrease the dose but is not yet ready to stop.  Current dose 30 mg.  Discussed lower dosage      History of Present Illness       Reason for visit:  Doctors note for the Flu  Symptom onset:  1-2 weeks ago  Symptoms include:  Body aches, Chills, Green Phlegm, Fog brain etc...  Symptom intensity:  Mild  Symptom progression:  Staying the same  Had these symptoms before:  Valentin consumes 2 sweetened beverage(s) daily.She exercises with enough effort to increase her heart rate 9 or less minutes per day.  She exercises with enough effort to increase her heart rate 3 or less days per week. She is missing 1 dose(s) of medications per week.  She is not taking prescribed medications regularly due to remembering to take.      REVIEW OF SYSTEMS:   Improving mood    PFSH:  Social History     Social History Narrative    Balanced Diet - No    Osteoporosis Prevention Measures - Dairy servings per day: 2-3    Regular Exercise -  No Describe walks    Dental Exam - NO    Eye Exam - Yes - Date: 9/09    Self Breast Exam - Yes    Abuse: Current or Past (Physical, Sexual or Emotional)- No    Do you feel safe in your environment - Yes    Guns stored in the home - No    Sunscreen used - Yes    Seatbelts used - Yes    Lipids -  YES - Date: 6/08    Glucose -  YES - Date: 2/09    Colon Cancer Screening - No    Hemoccults - UNKNOWN    Pap Test -  NO    Do you have any concerns about STD's -  No    Mammography - NO    DEXA - NO    Immunizations reviewed and up to date - No    TRACY Villa CMA    11/23/09               TOBACCO USE:  History   Smoking Status    Never   Smokeless Tobacco    Never       VITALS:  There were no vitals filed for this visit.  LMP 10/15/2023 (Approximate)  Estimated body mass index is 45.01 kg/m  as calculated from the following:    Height as of 2/28/22: 1.73 m (5' 8.11\").    Weight as of " "2/28/22: 134.7 kg (297 lb).    PHYSICAL EXAM:  (observations via Video)  Alert and oriented sitting in her apartment.    MEDICATIONS:   Current Outpatient Medications   Medication Sig Dispense Refill    DULoxetine (CYMBALTA) 20 MG capsule Take 1 capsule (20 mg) by mouth daily 90 capsule 3    metFORMIN (GLUCOPHAGE XR) 500 MG 24 hr tablet Take 2 tablets (1,000 mg) by mouth daily (with dinner) Take 3 tablets my mouth with dinner 180 tablet 1    MULTI-VITAMIN OR TABS 1 TABLET DAILY      norethindrone (LYLEQ) 0.35 MG tablet Take 1 tablet (0.35 mg) by mouth daily 90 tablet 3       Outside Notes summarized:   Labs, x-rays, cardiology, GI tests reviewed:   Recent Labs   Lab Test 02/28/22  0759   HGB 13.7   WBC 9.7      POTASSIUM 4.1   CR 0.82   A1C 5.6   TSH 2.52     No results found for: \"[id number]\"  Lab Results   Component Value Date    CHOL 191 02/28/2022    CHOL 178 08/27/2020     New orders: No orders of the defined types were placed in this encounter.      Independent review of:     Noé Murray MD  [facility]    Video-Visit Details  Type of service:  Video Visit  Patient has given verbal consent to a Video visit?  Yes  How would you like to obtain your AVS?  MyChart  Will anyone else be joining your video visit, giving supplemental history? No    Originating location (pt location): Home    Distant Location (provider location):  Off-site    Video Start Time: 5:08 PM  Video End time:  5:25 PM  Face to face plus orders: 17 minutes  Documentation time:  3 minutes     The visit lasted a total of 20 minutes       "

## 2023-11-01 NOTE — PROGRESS NOTES
Livia is a 33 year old who is being evaluated via a billable video visit.      How would you like to obtain your AVS? York Mailinghart  If the video visit is dropped, the invitation should be resent by: Other e-mail: MySQL  Will anyone else be joining your video visit? No  {If patient encounters technical issues they should call [phone] :916310}        {PROVIDER CHARTING PREFERENCE:246020}    Subjective   Livia is a 33 year old, presenting for the following health issues:  Consult (Needs a note for missing work.)      11/1/2023     2:45 PM   Additional Questions   Roomed by Sherine ZAVALA       History of Present Illness       Reason for visit:  Doctors note for the Flu  Symptom onset:  1-2 weeks ago  Symptoms include:  Body aches, Chills, Green Phlegm, Fog brain etc...  Symptom intensity:  Mild  Symptom progression:  Staying the same  Had these symptoms before:  Valentin consumes 2 sweetened beverage(s) daily.She exercises with enough effort to increase her heart rate 9 or less minutes per day.  She exercises with enough effort to increase her heart rate 3 or less days per week. She is missing 1 dose(s) of medications per week.  She is not taking prescribed medications regularly due to remembering to take.       {SUPERLIST (Optional):309884}  {additonal problems for provider to add (Optional):736532}      Review of Systems   {ROS COMP (Optional):669840}      Objective    Vitals - Patient Reported  Systolic (Patient Reported):  (does not monitor)  Pain Score: No Pain (0)        Physical Exam   {video visit exam brief selected:227871}    {Diagnostic Test Results (Optional):186720}    {AMBULATORY ATTESTATION (Optional):532676}        Video-Visit Details    Type of service:  Video Visit   Video Start Time: {video visit start/end time for provider to select:421497}  Video End Time:{video visit start/end time for provider to select:508985}    Originating Location (pt. Location): Home  {PROVIDER LOCATION On-site should be selected  for visits conducted from your clinic location or adjoining [facility] hospital, academic office, or other nearby [facility] building. Off-site should be selected for all other provider locations, including home:671288}  Distant Location (provider location):  On-site  Platform used for Video Visit: Sukhwnider

## 2023-11-01 NOTE — COMMUNITY RESOURCES LIST (ENGLISH)
11/01/2023   [facility]  N/A  For questions about this resource list or additional care needs, please contact your primary care clinic or care manager.  Phone: [phone]   Email: N/A   Address: [address] [place] [address]   Hours: N/A        Financial Stability       Rent and mortgage payment assistance  1  [place] [employer] - Stable HOME Program Distance: 0.79 miles      In-Person, Phone/Virtual   [address] [place] [address]  Language: English  Hours: Mon - Fri 7:30 AM - 5:00 PM   Phone: (963) 350-2577 Email: info@[employer].[facility] Website: http://www.[employer].[facility]/government/departments-divisions/housing/rental-assistance     2  [facility] - Rent and mortgage payment assistance Distance: 2.25 miles      In-Person, Phone/Virtual   [address] [place] [address]  Language: English  Hours: Mon - Thu 9:00 AM - 3:00 PM  Fees: Free   Phone: (428) 771-7386 Email: Sikhism@[facility].org Website: http://www.[facility].org/care-ministries/          Food and Nutrition       Food pantry  3  [facility] Distance: 1.52 miles      In-Person   [address] [place] [address]  Language: English, Greenlandic  Hours: Mon 10:00 AM - 6:00 PM , Tue 9:00 PM - 5:00 PM , Thu 11:00 PM - 7:00 PM , Sat 9:00 AM - 2:00 PM  Fees: Free   Phone: (188) 941-1687 Email: info@[employer].org Website: http://www.[employer].org/     4  [facility] (STEP) Distance: 1.87 miles      Delivery, Pickup   [address] [place] [address]  Language: English, Ugandan, Greenlandic  Hours: Mon 8:00 AM - 3:30 PM , Tue 12:00 PM - 7:00 PM , Wed - Thu 8:00 AM - 3:30 PM , Fri 8:00 AM - 11:30 PM  Fees: Free   Phone: (774) 841-2306 Email: info@[employer]EPslp.org Website: http://stepslp.org/     SNAP application assistance  5  [facility]  (LACM) Distance: 2.92 miles      In-Person, Phone/Virtual   [address] [place] [address]  Language: English, Divehi, Sammarinese  Hours: Mon - Fri 9:00 AM - 5:00 PM  Fees: Free   Phone: (733) 808-2204 Email: lynette@[employer] Website: https://www.T[employer]al[employer]in.[employer]/     6  [employer] [facility] Distance: 3.09 miles      In-Person   [address] [place] [address]  Language: English, Pitcairn Islander  Hours: Mon - Fri 8:30 AM - 5:00 PM  Fees: Free   Phone: (370) 702-7918 Email: info@[employer].org Website: http://www.OpenSesame.org/     Soup kitchen or free meals  7  [place] [facility] Recreation [employer] - Community Kitchen Distance: 1.53 miles      In-Person   [address] [place] [address]  Language: English  Hours: Mon - Fri 3:00 PM - 9:00 PM , Sat 12:00 PM - 6:00 PM  Fees: Free   Phone: (570) 644-1680 Email: jose@[employer] Website: https://www.[employer]/parks__destinations/recreation_centers/[place]_recreation_center/     8  [facility] and [facility] Distance: 2.35 miles      In-Person   [address] [place] [address]  Language: English, Palauan, [place] Islander  Hours: Mon - Fri 12:00 PM - 1:00 PM  Fees: Free   Phone: (683) 615-5236 Email: info@[employer] Website: http://www.[facility].org/locations/[facility]_ymca          Important Numbers & Websites       Emergency Services   911  City Services   311  Poison Control   (257) 535-4099  Suicide Prevention Lifeline   (593) 461-8382 (TALK)  Child Abuse Hotline   (281) 201-8277 (4-A-Child)  Sexual Assault Hotline   (130) 442-2170 (HOPE)  National Runaway Safeline   (148) 603-8761 (RUNAWAY)  All-Options Talkline   (921) 522-6821  Substance Abuse Referral   (757) 655-3885 (HELP)

## 2023-11-19 ENCOUNTER — MYC MEDICAL ADVICE (OUTPATIENT)
Dept: FAMILY MEDICINE | Facility: CLINIC | Age: 33
End: 2023-11-19
Payer: COMMERCIAL

## 2023-11-19 DIAGNOSIS — E28.2 PCOS (POLYCYSTIC OVARIAN SYNDROME): ICD-10-CM

## 2023-11-21 RX ORDER — METFORMIN HCL 500 MG
1000 TABLET, EXTENDED RELEASE 24 HR ORAL
Qty: 180 TABLET | Refills: 1 | Status: SHIPPED | OUTPATIENT
Start: 2023-11-21 | End: 2024-05-02

## 2024-02-03 ENCOUNTER — HEALTH MAINTENANCE LETTER (OUTPATIENT)
Age: 34
End: 2024-02-03

## 2024-02-13 ENCOUNTER — TRANSFERRED RECORDS (OUTPATIENT)
Dept: HEALTH INFORMATION MANAGEMENT | Facility: CLINIC | Age: 34
End: 2024-02-13

## 2024-05-02 ENCOUNTER — MYC REFILL (OUTPATIENT)
Dept: FAMILY MEDICINE | Facility: CLINIC | Age: 34
End: 2024-05-02
Payer: COMMERCIAL

## 2024-05-02 DIAGNOSIS — E28.2 PCOS (POLYCYSTIC OVARIAN SYNDROME): ICD-10-CM

## 2024-05-03 RX ORDER — METFORMIN HCL 500 MG
1000 TABLET, EXTENDED RELEASE 24 HR ORAL
Qty: 180 TABLET | Refills: 1 | Status: SHIPPED | OUTPATIENT
Start: 2024-05-03 | End: 2024-08-16

## 2024-06-03 ENCOUNTER — OFFICE VISIT (OUTPATIENT)
Dept: FAMILY MEDICINE | Facility: CLINIC | Age: 34
End: 2024-06-03
Payer: COMMERCIAL

## 2024-06-03 VITALS
RESPIRATION RATE: 18 BRPM | OXYGEN SATURATION: 96 % | BODY MASS INDEX: 43.4 KG/M2 | HEART RATE: 77 BPM | HEIGHT: 69 IN | WEIGHT: 293 LBS | SYSTOLIC BLOOD PRESSURE: 112 MMHG | TEMPERATURE: 97.5 F | DIASTOLIC BLOOD PRESSURE: 80 MMHG

## 2024-06-03 DIAGNOSIS — Z00.00 ANNUAL PHYSICAL EXAM: Primary | ICD-10-CM

## 2024-06-03 DIAGNOSIS — E28.2 PCOS (POLYCYSTIC OVARIAN SYNDROME): ICD-10-CM

## 2024-06-03 DIAGNOSIS — L30.9 DERMATITIS: ICD-10-CM

## 2024-06-03 DIAGNOSIS — R10.31 RLQ ABDOMINAL PAIN: ICD-10-CM

## 2024-06-03 DIAGNOSIS — F41.9 ANXIETY: ICD-10-CM

## 2024-06-03 DIAGNOSIS — F32.4 MAJOR DEPRESSIVE DISORDER WITH SINGLE EPISODE, IN PARTIAL REMISSION (H): ICD-10-CM

## 2024-06-03 DIAGNOSIS — R73.01 IMPAIRED FASTING GLUCOSE: ICD-10-CM

## 2024-06-03 DIAGNOSIS — E66.01 MORBID OBESITY (H): ICD-10-CM

## 2024-06-03 DIAGNOSIS — F41.1 GAD (GENERALIZED ANXIETY DISORDER): ICD-10-CM

## 2024-06-03 LAB
CHOLEST SERPL-MCNC: 165 MG/DL
ERYTHROCYTE [DISTWIDTH] IN BLOOD BY AUTOMATED COUNT: 13 % (ref 10–15)
FASTING STATUS PATIENT QL REPORTED: YES
HBA1C MFR BLD: 5.7 % (ref 0–5.6)
HCT VFR BLD AUTO: 45.5 % (ref 35–47)
HDLC SERPL-MCNC: 31 MG/DL
HGB BLD-MCNC: 15.3 G/DL (ref 11.7–15.7)
LDLC SERPL CALC-MCNC: 109 MG/DL
MCH RBC QN AUTO: 30.5 PG (ref 26.5–33)
MCHC RBC AUTO-ENTMCNC: 33.6 G/DL (ref 31.5–36.5)
MCV RBC AUTO: 91 FL (ref 78–100)
NONHDLC SERPL-MCNC: 134 MG/DL
PLATELET # BLD AUTO: 237 10E3/UL (ref 150–450)
RBC # BLD AUTO: 5.01 10E6/UL (ref 3.8–5.2)
TRIGL SERPL-MCNC: 127 MG/DL
TSH SERPL DL<=0.005 MIU/L-ACNC: 1.97 UIU/ML (ref 0.3–4.2)
WBC # BLD AUTO: 7.9 10E3/UL (ref 4–11)

## 2024-06-03 PROCEDURE — 85027 COMPLETE CBC AUTOMATED: CPT | Performed by: PHYSICIAN ASSISTANT

## 2024-06-03 PROCEDURE — 36415 COLL VENOUS BLD VENIPUNCTURE: CPT | Performed by: PHYSICIAN ASSISTANT

## 2024-06-03 PROCEDURE — 80061 LIPID PANEL: CPT | Performed by: PHYSICIAN ASSISTANT

## 2024-06-03 PROCEDURE — 83036 HEMOGLOBIN GLYCOSYLATED A1C: CPT | Performed by: PHYSICIAN ASSISTANT

## 2024-06-03 PROCEDURE — 99214 OFFICE O/P EST MOD 30 MIN: CPT | Mod: 25 | Performed by: PHYSICIAN ASSISTANT

## 2024-06-03 PROCEDURE — 99395 PREV VISIT EST AGE 18-39: CPT | Performed by: PHYSICIAN ASSISTANT

## 2024-06-03 PROCEDURE — 84443 ASSAY THYROID STIM HORMONE: CPT | Performed by: PHYSICIAN ASSISTANT

## 2024-06-03 RX ORDER — DULOXETIN HYDROCHLORIDE 30 MG/1
30 CAPSULE, DELAYED RELEASE ORAL DAILY
Qty: 90 CAPSULE | Refills: 3 | Status: SHIPPED | OUTPATIENT
Start: 2024-06-03

## 2024-06-03 SDOH — HEALTH STABILITY: PHYSICAL HEALTH: ON AVERAGE, HOW MANY DAYS PER WEEK DO YOU ENGAGE IN MODERATE TO STRENUOUS EXERCISE (LIKE A BRISK WALK)?: 3 DAYS

## 2024-06-03 ASSESSMENT — SOCIAL DETERMINANTS OF HEALTH (SDOH): HOW OFTEN DO YOU GET TOGETHER WITH FRIENDS OR RELATIVES?: ONCE A WEEK

## 2024-06-03 ASSESSMENT — PATIENT HEALTH QUESTIONNAIRE - PHQ9
10. IF YOU CHECKED OFF ANY PROBLEMS, HOW DIFFICULT HAVE THESE PROBLEMS MADE IT FOR YOU TO DO YOUR WORK, TAKE CARE OF THINGS AT HOME, OR GET ALONG WITH OTHER PEOPLE: VERY DIFFICULT
SUM OF ALL RESPONSES TO PHQ QUESTIONS 1-9: 13
SUM OF ALL RESPONSES TO PHQ QUESTIONS 1-9: 13

## 2024-06-03 NOTE — PROGRESS NOTES
"SUBJECTIVE    Livia Fallon is a 33 year old female who presents for an annual exam.    Other concerns today:  1.  Anxiety  She has depression and anxiety.  Has been taking Cymbalta 20 mg daily.  She feels like in general this is working well for her symptoms.  She has tried to cut down on her medicines in the past.  However, recently, she feels like anxiety has gotten worse.  She thinks she needs to increase her dose.    2.  Skin irritation  She notes she has a long history of sensitive skin.  She has noticed that her skin seems \"more sensitive\" recently.  Skin starts itching in various spots on her body.  Usually worst at bedtime.  Cortisone topical cream helps.  Overall, she does feel like itching is getting a little better.    3.  Right lower quadrant pain  Has had intermittent pain in the right lower quadrant/adnexal area.  This is pretty infrequent.  Been going on for a long time.  Unclear etiology.  She thinks it could be related to constipation, or possibly an ovarian cyst.      Patient Active Problem List    Diagnosis Date Noted    Major depression, single episode 11/01/2023     Priority: Medium    Morbid obesity (H) 06/04/2012     Priority: Medium    Impaired fasting glucose 08/19/2011     Priority: Medium    PCOS (polycystic ovarian syndrome) 03/18/2010     Priority: Medium        Immunization History   Administered Date(s) Administered    COVID-19 MONOVALENT 12+ (Pfizer) 05/04/2021, 05/25/2021, 01/05/2022    HIB, Unspecified 01/12/1990    HPV 06/23/2008, 09/11/2008, 02/10/2009    HPV Quadrivalent 06/23/2008, 09/11/2008, 02/10/2009    Hepatitis B, Adult 09/28/2005    Hepatitis B, Peds 08/30/2001, 05/08/2002    Historical DTP/aP 1989, 01/18/1990, 03/27/1990, 07/21/1992, 12/02/1994    Influenza (H1N1) 12/02/2009    Influenza (IIV3) PF 12/02/2009    Influenza, seasonal, injectable, PF 12/02/2009    MMR 12/12/1990, 08/30/2001    OPV, trivalent, live 1989    Polio, Unspecified 01/18/1990, " 03/27/1990, 12/02/1994    TDAP (Adacel,Boostrix) 02/28/2022    TDAP Vaccine (Adacel) 01/04/2011    Td (Adult), Adsorbed 08/30/2001    Varicella 09/28/2005       Patient's last menstrual period was 05/15/2024 (exact date).  OB History   No obstetric history on file.       Current Outpatient Medications   Medication Sig Dispense Refill    DULoxetine (CYMBALTA) 20 MG capsule Take 1 capsule (20 mg) by mouth daily 90 capsule 3    metFORMIN (GLUCOPHAGE XR) 500 MG 24 hr tablet Take 2 tablets (1,000 mg) by mouth daily (with dinner) 180 tablet 1    MULTI-VITAMIN OR TABS 1 TABLET DAILY      norethindrone (LYLEQ) 0.35 MG tablet Take 1 tablet (0.35 mg) by mouth daily 90 tablet 3     No current facility-administered medications for this visit.       Past Medical History:   Diagnosis Date    PRISCILLA (generalized anxiety disorder) 11/01/2023    GERD (gastroesophageal reflux disease) 07/06/2012    Impaired fasting glucose 08/19/2011    Major depression, single episode 11/01/2023    Morbid obesity (H) 06/04/2012       No past surgical history on file.     Family History   Problem Relation Age of Onset    Diabetes Mother     Unknown/Adopted Mother     Diabetes Father         pre diabetic    Cancer Paternal Grandfather         lung cancer(smoker)          General Health   How would you rate your overall physical health? (!) FAIR   Feel stress (tense, anxious, or unable to sleep) Rather much   (!) STRESS CONCERN      6/3/2024   Nutrition   Three or more servings of calcium each day? (!) NO   Diet: Regular (no restrictions)    Low salt   How many servings of fruit and vegetables per day? (!) 0-1   How many sweetened beverages each day? 0-1         6/3/2024   Exercise   Days per week of moderate/strenous exercise 3 days         6/3/2024   Social Factors   Frequency of gathering with friends or relatives Once a week   Worry food won't last until get money to buy more Yes   Food not last or not have enough money for food? Yes   Do you have  "housing?  Yes   Are you worried about losing your housing? No   Lack of transportation? No   Unable to get utilities (heat,electricity)? Yes   Want help with housing or utility concern? No   (!) FOOD SECURITY CONCERN PRESENT(!) FINANCIAL RESOURCE STRAIN CONCERN      6/3/2024   Dental   Dentist two times every year? (!) NO         6/3/2024   TB Screening   Were you born outside of the [place]? Yes       Today's PHQ-9 Score:       6/3/2024     8:02 AM   PHQ-9 SCORE   PHQ-9 Total Score MyChart 13 (Moderate depression)   PHQ-9 Total Score 13         6/3/2024   Substance Use   Alcohol more than 3/day or more than 7/wk Not Applicable   Do you use any other substances recreationally? (!) CANNABIS PRODUCTS     Social History     Tobacco Use    Smoking status: Never    Smokeless tobacco: Never   Vaping Use    Vaping status: Never Used   Substance Use Topics    Alcohol use: Yes     Comment: once/month    Drug use: Yes     Types: Marijuana     Comment: weed every few weeks           6/3/2024   STI Screening   New sexual partner(s) since last STI/HIV test? No           3/18/2010    12:00 AM   PAP / HPV   PAP (Historical) NIL            6/3/2024   Contraception/Family Planning   Questions about contraception or family planning No       Reviewed and updated as needed this visit by Provider                    OBJECTIVE    Vitals:    06/03/24 0812   BP: 112/80   BP Location: Left arm   Patient Position: Sitting   Cuff Size: Adult Large   Pulse: 77   Resp: 18   Temp: 97.5  F (36.4  C)   TempSrc: Temporal   SpO2: 96%   Weight: 137.4 kg (303 lb)   Height: 1.74 m (5' 8.5\")       Body mass index is 45.4 kg/m .    Physical Exam:  General: patient is alert and oriented x 3, in no apparent distress  HEENT, Thyroid, Lymphatic, Cardiac, Pulmonary, GI, Musculoskeletal, and Neuro exams were completed today and grossly normal  Breast exam: Deferred  Genitourinary: Deferred, exam up-to-date  Skin: area of minimal erythema and skin irritation present " on left upper inner arm near the axilla, consistent with possible irritant dermatitis, no vesicles, no pustules      Today's labs pending.        ASSESSMENT and PLAN  1. Annual physical exam  Health maintenance is discussed with patient as appropriate for age and risk factors.  Screening labs ordered and I will follow-up with results.  Has birth control pills that she is using.  She is up-to-date on her Pap smear and STD screening, through [facility].  JOSÉ LUIS signed to get those records.  She declines COVID booster shot today but may consider in the future.  - Lipid Profile  - CBC with platelets    2. Morbid obesity (H)  Chronic, stable.  Continue to encourage healthy eating and exercise.  Screening labs ordered and I will follow-up with results.  She declines bariatric referral today.  - Hemoglobin A1c  - TSH with free T4 reflex    3. Impaired fasting glucose  Chronic, stable.  Past history of impaired fasting glucose/prediabetes.  A1c checked today and remains elevated at 5.7, but stable as compared to previous checks.  Continue to check annually.  - Hemoglobin A1c    4. PCOS (polycystic ovarian syndrome)  Chronic, stable.  No significant concerns today.  Continue to take metformin.  Screening labs ordered and I will follow-up with results.  - Hemoglobin A1c  - TSH with free T4 reflex    5. Major depressive disorder with single episode, in partial remission (H24)  6. PRISCILLA (generalized anxiety disorder)  Chronic, somewhat worsening.  Taking Cymbalta 20 mg daily.  This is a lower dose, because she had been doing better.  Right now she feels like anxiety is getting a bit worse and she would like to increase her dose.  Will stop 20 mg pill and start 30 mg of Cymbalta daily.  Plan follow-up in 1 month.  If, for some reason, increased dose causes any unwanted side effects, she will let us know.  We reviewed her Social Factors, see above, and she reports no significant concerns, declines care management/social work  referral.  We specifically reviewed her elevated PHQ-9 score today.  She declines mental health counseling referral.  Patient denies any active thoughts of wanting to hurt him/herself or others and does contract for safety.  - DULoxetine (CYMBALTA) 30 MG capsule; Take 1 capsule (30 mg) by mouth daily  Dispense: 90 capsule; Refill: 3    7. Dermatitis  New concern.  Notes intermittent skin dermatitis in various spots on her body.  She does feel like this is a little better than it has been.  We discussed trying an allergy medicine.  However she notes those medicines give her significant constipation.  I discussed considering Benadryl at bedtime, since that is when itching is the worst.  She declines dermatology referral at this time, but will consider it if symptoms worsen.  She is also using topical hydrocortisone which is helpful.    8. RLQ abdominal pain  New concern.  Chronic intermittent problem.  Happens intermittently at random times, hard for her to actually trigger pain.  She suspects it may have something to do with constipation.  Exam is normal today.  She does not have an acute abdomen.  Normal no red flags on exam or history.  We discussed working on constipation and monitoring.  Discussed there is a small possibility she could have a cyst on her ovary.  She declines pelvic ultrasound.  Will continue to monitor and follow-up if this is getting worse or more frequent, or if other unusual symptoms develop.            This dictation uses voice recognition software, which may contain typographical errors.       Normal vision: sees adequately in most situations; can see medication labels, newsprint

## 2024-06-03 NOTE — COMMUNITY RESOURCES LIST (ENGLISH)
Emily 3, 2024           YOUR PERSONALIZED LIST OF SERVICES & PROGRAMS           NAVIGATION    Eligibility Screening      [facility]  [address] [place] [address] (Distance: 11.1 miles)  Phone: [phone]  Email: [email or web address]  Website: https://www.[employer]/individual-health  Language: Thai, English, Setswana, Kyrgyz, Romanian, Sinhala  Fee: Free  Accessibility: Ada accessible, Blind accommodation, Deaf or hard of hearing, Translation services      [facility] application assistance  [address] [place] [address] (Distance: 8.7 miles)  Phone: (967) [phone]  Website: https://www.[facility]/ph/public-health  Language: English  Fee: Free  Accessibility: Translation services, Ada accessible      [facility] [place] - SNAP (formerly food stamps) Screening and Application help  Phone: (929) 791-5632  Website: https://www.Pixspan.org/programs/[place]-food-helpline/  Language: English  Hours: Mon 10:00 AM - 5:00 PM Tue 10:00 AM - 5:00 PM Wed 10:00 AM - 5:00 PM Thu 10:00 AM - 5:00 PM Fri 10:00 AM - 5:00 PM  Fee: Free  Accessibility: Ada accessible, Blind accommodation, Deaf or hard of hearing, Translation services        ASSISTANCE    Nutrition Benefits      [facility] application assistance  [address] [place] [address] (Distance: 8.7 miles)  Phone: (843) [phone]  Website: https://www.[facility]/ph/public-health  Language: English  Fee: Free  Accessibility: Translation services, Ada accessible      [employer] [place] - SNAP (formerly food stamps) Screening and Application help  Phone: (159) 479-2092  Website: https://www.[employer].org/programs/[place]-food-helpline/  Language: English  Hours: Mon 10:00 AM - 5:00 PM Tue 10:00 AM - 5:00 PM Wed 10:00 AM - 5:00 PM Thu 10:00 AM - 5:00 PM Fri 10:00 AM - 5:00 PM  Fee: Free  Accessibility: Ada accessible, Blind  accommodation, Deaf or hard of hearing, Translation services      [facility]  Phone: ([phone]) [phone]  Website: https://www.[facility].org/programs/market-bucks/  Language: English  Hours: Mon 10:00 AM - 5:00 PM Tue 10:00 AM - 5:00 PM Wed 10:00 AM - 5:00 PM Thu 10:00 AM - 5:00 PM Fri 10:00 AM - 5:00 PM  Fee: Self pay    Pantry      [facility] Emergency Program (STEP) - Food pantry  [address] [place] [address] (Distance: 1.9 miles)  Phone: [phone]) [phone]  Website: http://stepslp.org/  Language: English, Chadian, [place] Islander  Fee: Free  Accessibility: Ada accessible  Transportation Options: Free transportation      [employer] [employer] - [employer]  [address] [place] [address] (Distance: 3.4 miles)  Phone: (718) 967-9817  Website: http://www.Perfect Audience.org/  Language: English, Chadian, [place] Islander  Fee: Free  Accessibility: Blind accommodation, Deaf or hard of hearing      [employer] - [place] [employer]  Phone: (900) 732-6342  Website: www.Postcard [employer] Tag.org  Language: English, Chadian  Hours: Mon 9:00 AM - 3:30 PM Tue 9:00 AM - 6:30 PM Wed 9:00 AM - 3:30 PM Thu 9:00 AM - 12:30 PM Fri 9:00 AM - 12:30 PM Sat 9:00 AM - 12:00 PM  Fee: Free            Bill Payment Assistance      [facility] Utility payment assistance - [facility]  [address] [place] [address] (Distance: 13.1 miles)  Phone: [phone]) [phone]  Language: English, Chadian  Fee: Free, Self pay  Accessibility: Ada accessible      [employer] - [employer]  Phone: (356) 446-5508  Website: https://www.tjo14-scmrbltjvxfzmq.org/programs.html  Language: English  Hours: Mon 7:00 AM - 7:00 PM Tue 7:00 AM - 7:00 PM Wed 7:00 AM - 7:00 PM Thu 7:00 AM - 7:00 PM Fri 7:00 AM - 7:00 PM  Fee: Free      - Dislocated Worker/Adult [employer] Employment Program  Phone: (502) 907-1875  Email:  kourtney@[employer].org  Website: https://My[employer]elBack/services/employment-services/dislocated-worker-program/  Language: English, Andorran  Hours: Mon 8:00 AM - 4:30 PM Tue 8:00 AM - 4:30 PM Wed 8:00 AM - 4:30 PM Thu 8:00 AM - 4:30 PM Fri 8:00 AM - 4:30 PM  Fee: Free  Accessibility: Ada accessible               IMPORTANT NUMBERS & WEBSITES        Emergency Services  911  .   [facility]  211 http://211unitedway.org  .   Poison Control  (163) 896-1897 http://[place]poison.org http://wis[place]poison.org  .     Suicide and Crisis Lifeline  988 http://988Geenappline.org  .   Childhelp [place] Child Abuse Hotline  412.571.3607 http://Childhelphotline.org   .   [place] Sexual Assault Hotline  (835) 690-2799 (HOPE) http://Korbit.[employer]   .     [place] Runaway Safeline  (319) 700-6944 (RUNAWAY) http://[employer] Performance.[employer]  .   Pregnancy & Postpartum Support  Call/text 151-896-5354  [place]: http://ppsupportmn.org  [place]: http://[employer].com/wi  .   Substance Abuse National Helpline ([facility])  502-188-HELP (4193) http://Findtreatment.gov   .                DISCLAIMER: These resources have been generated via the [employer] Platform. [employer] does not endorse any service providers mentioned in this resource list. [employer] does not guarantee that the services mentioned in this resource list will be available to you or will improve your health or wellness.    [facility]

## 2024-08-11 ENCOUNTER — MYC REFILL (OUTPATIENT)
Dept: FAMILY MEDICINE | Facility: CLINIC | Age: 34
End: 2024-08-11
Payer: COMMERCIAL

## 2024-08-11 DIAGNOSIS — E28.2 PCOS (POLYCYSTIC OVARIAN SYNDROME): ICD-10-CM

## 2024-08-12 RX ORDER — METFORMIN HCL 500 MG
1000 TABLET, EXTENDED RELEASE 24 HR ORAL
Qty: 180 TABLET | Refills: 1 | OUTPATIENT
Start: 2024-08-12

## 2024-08-16 ENCOUNTER — MYC REFILL (OUTPATIENT)
Dept: FAMILY MEDICINE | Facility: CLINIC | Age: 34
End: 2024-08-16
Payer: COMMERCIAL

## 2024-08-16 DIAGNOSIS — E28.2 PCOS (POLYCYSTIC OVARIAN SYNDROME): ICD-10-CM

## 2024-08-16 RX ORDER — METFORMIN HCL 500 MG
1000 TABLET, EXTENDED RELEASE 24 HR ORAL
Qty: 180 TABLET | Refills: 1 | Status: SHIPPED | OUTPATIENT
Start: 2024-08-16

## 2024-11-09 ENCOUNTER — HEALTH MAINTENANCE LETTER (OUTPATIENT)
Age: 34
End: 2024-11-09

## 2025-03-02 ENCOUNTER — HEALTH MAINTENANCE LETTER (OUTPATIENT)
Age: 35
End: 2025-03-02

## 2025-05-05 ENCOUNTER — PATIENT OUTREACH (OUTPATIENT)
Dept: CARE COORDINATION | Facility: CLINIC | Age: 35
End: 2025-05-05
Payer: COMMERCIAL

## 2025-05-19 ENCOUNTER — PATIENT OUTREACH (OUTPATIENT)
Dept: CARE COORDINATION | Facility: CLINIC | Age: 35
End: 2025-05-19
Payer: COMMERCIAL

## 2025-05-28 ENCOUNTER — MYC REFILL (OUTPATIENT)
Dept: FAMILY MEDICINE | Facility: CLINIC | Age: 35
End: 2025-05-28
Payer: COMMERCIAL

## 2025-05-28 DIAGNOSIS — E28.2 PCOS (POLYCYSTIC OVARIAN SYNDROME): ICD-10-CM

## 2025-05-28 RX ORDER — METFORMIN HYDROCHLORIDE 500 MG/1
1000 TABLET, EXTENDED RELEASE ORAL
Qty: 180 TABLET | Refills: 1 | Status: SHIPPED | OUTPATIENT
Start: 2025-05-28

## 2025-06-09 SDOH — HEALTH STABILITY: PHYSICAL HEALTH: ON AVERAGE, HOW MANY MINUTES DO YOU ENGAGE IN EXERCISE AT THIS LEVEL?: 100 MIN

## 2025-06-09 SDOH — HEALTH STABILITY: PHYSICAL HEALTH: ON AVERAGE, HOW MANY DAYS PER WEEK DO YOU ENGAGE IN MODERATE TO STRENUOUS EXERCISE (LIKE A BRISK WALK)?: 4 DAYS

## 2025-06-09 ASSESSMENT — SOCIAL DETERMINANTS OF HEALTH (SDOH): HOW OFTEN DO YOU GET TOGETHER WITH FRIENDS OR RELATIVES?: NEVER

## 2025-06-09 ASSESSMENT — PATIENT HEALTH QUESTIONNAIRE - PHQ9: SUM OF ALL RESPONSES TO PHQ QUESTIONS 1-9: 12

## 2025-06-10 ASSESSMENT — PATIENT HEALTH QUESTIONNAIRE - PHQ9
SUM OF ALL RESPONSES TO PHQ QUESTIONS 1-9: 12
10. IF YOU CHECKED OFF ANY PROBLEMS, HOW DIFFICULT HAVE THESE PROBLEMS MADE IT FOR YOU TO DO YOUR WORK, TAKE CARE OF THINGS AT HOME, OR GET ALONG WITH OTHER PEOPLE: VERY DIFFICULT

## 2025-06-21 ENCOUNTER — HEALTH MAINTENANCE LETTER (OUTPATIENT)
Age: 35
End: 2025-06-21

## 2025-06-30 ENCOUNTER — TELEPHONE (OUTPATIENT)
Dept: FAMILY MEDICINE | Facility: CLINIC | Age: 35
End: 2025-06-30
Payer: COMMERCIAL

## 2025-06-30 SDOH — HEALTH STABILITY: PHYSICAL HEALTH: ON AVERAGE, HOW MANY MINUTES DO YOU ENGAGE IN EXERCISE AT THIS LEVEL?: 100 MIN

## 2025-06-30 SDOH — HEALTH STABILITY: PHYSICAL HEALTH: ON AVERAGE, HOW MANY DAYS PER WEEK DO YOU ENGAGE IN MODERATE TO STRENUOUS EXERCISE (LIKE A BRISK WALK)?: 4 DAYS

## 2025-06-30 ASSESSMENT — SOCIAL DETERMINANTS OF HEALTH (SDOH): HOW OFTEN DO YOU GET TOGETHER WITH FRIENDS OR RELATIVES?: PATIENT DECLINED

## 2025-06-30 NOTE — TELEPHONE ENCOUNTER
Contacts       Contact Date/Time Type Contact Phone/Fax    06/30/2025 04:48 PM CDT Phone (Outgoing) Livia Fallon (Self) [phone] (H)    Left Message           Attempted to reach patient to: Relay a message    Regarding: called for appointment reminder, left a message

## 2025-07-01 ENCOUNTER — TELEPHONE (OUTPATIENT)
Dept: FAMILY MEDICINE | Facility: CLINIC | Age: 35
End: 2025-07-01
Payer: COMMERCIAL

## 2025-07-01 NOTE — TELEPHONE ENCOUNTER
Contacts       Contact Date/Time Type Contact Phone/Fax    2025 01:37 PM CDT Phone (Outgoing) Livia Fallon (Self) [phone] (H)    No Answer/Busy           Attempted to reach patient to: Relay a message    Regardin/2 Appointment    Action to take: OK to relay (verbatim): please remind patient of her appointment that she has on  at  8:10 am and let her know that there is construction in the area so to allow herself extra travel time.

## 2025-07-02 ENCOUNTER — RESULTS FOLLOW-UP (OUTPATIENT)
Dept: FAMILY MEDICINE | Facility: CLINIC | Age: 35
End: 2025-07-02

## 2025-07-02 ENCOUNTER — OFFICE VISIT (OUTPATIENT)
Dept: FAMILY MEDICINE | Facility: CLINIC | Age: 35
End: 2025-07-02
Payer: COMMERCIAL

## 2025-07-02 VITALS
BODY MASS INDEX: 42.06 KG/M2 | OXYGEN SATURATION: 98 % | DIASTOLIC BLOOD PRESSURE: 62 MMHG | TEMPERATURE: 97.1 F | HEART RATE: 72 BPM | WEIGHT: 284 LBS | RESPIRATION RATE: 18 BRPM | HEIGHT: 69 IN | SYSTOLIC BLOOD PRESSURE: 102 MMHG

## 2025-07-02 DIAGNOSIS — E66.01 MORBID OBESITY (H): ICD-10-CM

## 2025-07-02 DIAGNOSIS — Z13.9 ENCOUNTER FOR SCREENING INVOLVING SOCIAL DETERMINANTS OF HEALTH (SDOH): ICD-10-CM

## 2025-07-02 DIAGNOSIS — Z84.81 FAMILY HISTORY OF GENETIC DISEASE CARRIER: ICD-10-CM

## 2025-07-02 DIAGNOSIS — F41.9 ANXIETY: ICD-10-CM

## 2025-07-02 DIAGNOSIS — R73.01 IMPAIRED FASTING GLUCOSE: ICD-10-CM

## 2025-07-02 DIAGNOSIS — L85.3 DRY SKIN: ICD-10-CM

## 2025-07-02 DIAGNOSIS — Z13.6 SCREENING FOR CARDIOVASCULAR CONDITION: ICD-10-CM

## 2025-07-02 DIAGNOSIS — Z00.00 ANNUAL PHYSICAL EXAM: Primary | ICD-10-CM

## 2025-07-02 DIAGNOSIS — F32.4 MAJOR DEPRESSIVE DISORDER WITH SINGLE EPISODE, IN PARTIAL REMISSION: ICD-10-CM

## 2025-07-02 LAB
CHOLEST SERPL-MCNC: 183 MG/DL
ERYTHROCYTE [DISTWIDTH] IN BLOOD BY AUTOMATED COUNT: 12.5 % (ref 10–15)
EST. AVERAGE GLUCOSE BLD GHB EST-MCNC: 117 MG/DL
FASTING STATUS PATIENT QL REPORTED: YES
HBA1C MFR BLD: 5.7 % (ref 0–5.6)
HCT VFR BLD AUTO: 45.1 % (ref 35–47)
HDLC SERPL-MCNC: 35 MG/DL
HGB BLD-MCNC: 15.4 G/DL (ref 11.7–15.7)
LDLC SERPL CALC-MCNC: 121 MG/DL
MCH RBC QN AUTO: 30.7 PG (ref 26.5–33)
MCHC RBC AUTO-ENTMCNC: 34.1 G/DL (ref 31.5–36.5)
MCV RBC AUTO: 90 FL (ref 78–100)
NONHDLC SERPL-MCNC: 148 MG/DL
PLATELET # BLD AUTO: 257 10E3/UL (ref 150–450)
RBC # BLD AUTO: 5.01 10E6/UL (ref 3.8–5.2)
TRIGL SERPL-MCNC: 134 MG/DL
TSH SERPL DL<=0.005 MIU/L-ACNC: 1.16 UIU/ML (ref 0.3–4.2)
WBC # BLD AUTO: 7.7 10E3/UL (ref 4–11)

## 2025-07-02 PROCEDURE — 99395 PREV VISIT EST AGE 18-39: CPT | Performed by: PHYSICIAN ASSISTANT

## 2025-07-02 PROCEDURE — 84443 ASSAY THYROID STIM HORMONE: CPT | Performed by: PHYSICIAN ASSISTANT

## 2025-07-02 PROCEDURE — 99214 OFFICE O/P EST MOD 30 MIN: CPT | Mod: 25 | Performed by: PHYSICIAN ASSISTANT

## 2025-07-02 PROCEDURE — 85027 COMPLETE CBC AUTOMATED: CPT | Performed by: PHYSICIAN ASSISTANT

## 2025-07-02 PROCEDURE — 80061 LIPID PANEL: CPT | Performed by: PHYSICIAN ASSISTANT

## 2025-07-02 PROCEDURE — 83036 HEMOGLOBIN GLYCOSYLATED A1C: CPT | Performed by: PHYSICIAN ASSISTANT

## 2025-07-02 PROCEDURE — 36415 COLL VENOUS BLD VENIPUNCTURE: CPT | Performed by: PHYSICIAN ASSISTANT

## 2025-07-02 RX ORDER — ESCITALOPRAM OXALATE 10 MG/1
TABLET ORAL
Qty: 90 TABLET | Refills: 3 | Status: SHIPPED | OUTPATIENT
Start: 2025-07-02

## 2025-07-02 RX ORDER — DULOXETIN HYDROCHLORIDE 20 MG/1
CAPSULE, DELAYED RELEASE ORAL
Qty: 11 CAPSULE | Refills: 0 | Status: SHIPPED | OUTPATIENT
Start: 2025-07-02

## 2025-07-02 NOTE — PROGRESS NOTES
SUBJECTIVE    Livia Fallon is a 34 year old female who presents for an annual exam.    Other concerns today:  1.  Adjusting depression medication  She had been taking Cymbalta.  Thinking that it may be making her sweat more.  Primarily on her face.  Does feel flushing.  Sweating a lot makes her more anxious, which then makes her sweat more.  When she started her mental health medications, she was in a different situation.  Right now she is in a supportive relationship and doing well.  She is interested in possibly switching Cymbalta and something else.  She did try Lexapro in the past.  She notes she did not have any bad side effects from it, but is not sure how well it worked.    2.  Skin  Had significantly dry cracked and peeling skin on her feet over the winter.  It is getting better now that is summertime.  Feet were very itchy.  She is wondering if there is something she can do during the wintertime to prevent them from getting that bad.    3.  Family history of genetic issue  Since her mom told her she was recently diagnosed with Damion-Danlos syndrome.  Patient says she does feel like she has hypermobile joints, and some other things.  She may be interested in getting tested for this.    Patient Active Problem List    Diagnosis Date Noted    Anxiety 06/03/2024     Priority: Medium    Major depression, single episode 11/01/2023     Priority: Medium    Morbid obesity (H) 06/04/2012     Priority: Medium    Impaired fasting glucose 08/19/2011     Priority: Medium    PCOS (polycystic ovarian syndrome) 03/18/2010     Priority: Medium        Immunization History   Administered Date(s) Administered    COVID-19 MONOVALENT 12+ (Pfizer) 05/04/2021, 05/25/2021, 01/05/2022    HIB, Unspecified 01/12/1990    HPV 06/23/2008, 09/11/2008, 02/10/2009    HPV Quadrivalent 06/23/2008, 09/11/2008, 02/10/2009    Hepatitis B, Adult (Energix-B/Recombivax HB) 09/28/2005    Hepatitis B, Peds (Engerix-B/Recombivax HB) 08/30/2001,  05/08/2002    Historical DTP/aP 1989, 01/18/1990, 03/27/1990, 07/21/1992, 12/02/1994    Influenza (H1N1) 12/02/2009    Influenza (IIV3) PF 12/02/2009    Influenza (prior to 2024) 12/02/2009    MMR (MMRII) 12/12/1990, 08/30/2001    OPV, trivalent, live 1989    Polio, Unspecified 01/18/1990, 03/27/1990, 12/02/1994    TDAP (Adacel,Boostrix) 02/28/2022    TDAP Vaccine (Adacel) 01/04/2011    Td (Adult), Adsorbed 08/30/2001    Varicella (Varivax) 09/28/2005       Patient's last menstrual period was 06/18/2025 (approximate).  OB History   No obstetric history on file.       Current Outpatient Medications   Medication Sig Dispense Refill    DULoxetine (CYMBALTA) 30 MG capsule TAKE 1 CAPSULE BY MOUTH EVERY DAY 30 capsule 0    metFORMIN (GLUCOPHAGE XR) 500 MG 24 hr tablet Take 2 tablets (1,000 mg) by mouth daily (with dinner). 180 tablet 1    norethindrone (LYLEQ) 0.35 MG tablet Take 1 tablet (0.35 mg) by mouth daily 90 tablet 3     No current facility-administered medications for this visit.       Past Medical History:   Diagnosis Date    PRISCILLA (generalized anxiety disorder) 11/01/2023    GERD (gastroesophageal reflux disease) 07/06/2012    Impaired fasting glucose 08/19/2011    Major depression, single episode 11/01/2023    Morbid obesity (H) 06/04/2012       No past surgical history on file.     Family History   Problem Relation Age of Onset    Diabetes Mother     Unknown/Adopted Mother     Diabetes Father         pre diabetic    Cancer Paternal Grandfather         lung cancer(smoker)          Livia is a 34 year old, presenting for the following:  Physical (Pt do not want [facility] ) and Recheck Medication (Pt want to changes medication they not working said depression medication make her sweat )      Advance Care Planning  {The storyboard will display whether the patient has ACP docs on file. Hover over the Code section in the storyboard to access the ACP documents.  :[id number]}  Discussed advance care planning with patient; however, patient declined at this time.        6/30/2025   General Health   How would you rate your overall physical health? (!) FAIR   Feel stress (tense, anxious, or unable to sleep) To some extent   (!) STRESS CONCERN      6/30/2025   Nutrition   Three or more servings of calcium each day? Yes   Diet: Regular (no restrictions)   How many servings of fruit and vegetables per day? (!) 0-1   How many sweetened beverages each day? (!) 2         6/30/2025   Exercise   Days per week of moderate/strenous exercise 4 days   Average minutes spent exercising at this level 100 min         6/30/2025   Social Factors   Frequency of gathering with friends or relatives Patient declined   Worry food won't last until get money to buy more Yes   Food not last or not have enough money for food? Yes   Do you have housing? (Housing is defined as stable permanent housing and does not include staying outside in a car, in a tent, in an abandoned building, in an overnight shelter, or couch-surfing.) Yes   Are you worried about losing your housing? Yes   Lack of transportation? No   Unable to get utilities (heat,electricity)? No   Want help with housing or utility concern? (!) YES   (!) FOOD SECURITY CONCERN PRESENT(!) HOUSING CONCERN PRESENT      6/30/2025   Dental   Dentist two times every year? (!) NO       Today's PHQ-9 Score:       6/9/2025     6:37 AM   PHQ-9 SCORE   PHQ-9 Total Score MyChart 12 (Moderate depression)   PHQ-9 Total Score 12        Patient-reported         6/30/2025   Substance Use   Alcohol more than 3/day or more than 7/wk No   Do you use any other substances recreationally? (!) CANNABIS PRODUCTS     Social History     Tobacco Use    Smoking status: Never    Smokeless tobacco: Never   Vaping Use    Vaping status: Never Used   Substance Use Topics    Alcohol use: Yes     Comment: once/month    Drug use: Yes     Types: Marijuana     Comment: weed every few weeks  "          6/30/2025   STI Screening   New sexual partner(s) since last STI/HIV test? No           3/18/2010    12:00 AM   PAP / HPV   PAP (Historical) NIL            6/30/2025   Contraception/Family Planning   Questions about contraception or family planning No   What are your periods like? (!) IRREGULAR     {Provider  REQUIRED FOR AWV Use the storyboard to review patient history, after sections have been marked as reviewed, refresh note to capture documentation:[id number]}   Reviewed and updated as needed this visit by Provider                OBJECTIVE    Vitals:    07/02/25 0814   BP: 102/62   BP Location: Left arm   Patient Position: Sitting   Cuff Size: Adult Regular   Pulse: 72   Resp: 18   Temp: 97.1  F (36.2  C)   TempSrc: Temporal   SpO2: 98%   Weight: 128.8 kg (284 lb)   Height: 1.75 m (5' 8.9\")       Body mass index is 42.06 kg/m .    Physical Exam:  General: patient is alert and oriented x 3, in no apparent distress, appropriately groomed with normal affect  HEENT, Thyroid, Lymphatic, Cardiac, Pulmonary, GI, Musculoskeletal, Skin, and Neuro exams were completed today and grossly normal  Breast exam: deferred  Genitourinary: deferred      Today's labs pending.        ASSESSMENT and PLAN  1. Annual physical exam (Primary)  Health maintenance is discussed with patient as appropriate for age and risk factors.  Screening labs ordered and I will follow-up with results.  She [facility].  She declines STD screening.    2. Major depressive disorder with single episode, in partial remission  3. Anxiety  Chronic, somewhat improving.  Think she is possibly getting side effect of of sweating and flushing from Cymbalta.  Interested in stopping that and starting something else.  She had tried Lexapro in the past, no known side effects.  Will cross taper Cymbalta and Lexapro.  I asked her to follow-up in approximately 1 month, sooner if worsening or other concerns.  If sweating " and flushing symptoms do not resolve after medication switch, consider further workup.    4. Impaired fasting glucose  Chronic, stable.  History of PCOS.  Taking metformin as directed.  Due for screening A1c today.  I will follow-up with results.    5. Morbid obesity (H)  Chronic, improving.  She is lost about 20 pounds since last year.  She is exercising more, continuing to work on healthy eating.    6. Family history of genetic disease carrier  New concern.  Patient reports her mom said she was diagnosed with Damion-Danlos syndrome.  Patient does have some symptoms which she thinks could be consistent with that diagnosis.  She would be interested in getting tested.  Referral made to genetics for further evaluation.  - Adult Genetics & Metabolism  Referral; Future    7. Dry skin  Chronic, improving.  Dry flaky itching skin on the feet, worst in the wintertime.  Improving now.  She is trying over-the-counter moisturizing lotion.  Referral made to dermatology for follow-up and prevention strategies.  - Adult Dermatology  Referral; Future    8. Encounter for screening involving social determinants of health (SDoH)  - Primary Care - Care Coordination Referral; Future      This dictation uses voice recognition software, which may contain typographical errors.

## 2025-07-02 NOTE — LETTER
2025    Livia Fallon   1990        To Whom it May Concern;    Livia Fallon is a patient at [facility].  Please allow her to use a headphone in one ear during her work shifts.  This is a part of her treatment plan for her chronic medical conditions.    Sincerely,        Kim Colindres PA-C

## 2025-07-03 ENCOUNTER — PATIENT OUTREACH (OUTPATIENT)
Dept: CARE COORDINATION | Facility: CLINIC | Age: 35
End: 2025-07-03
Payer: COMMERCIAL

## 2025-07-03 ENCOUNTER — TELEPHONE (OUTPATIENT)
Dept: CONSULT | Facility: CLINIC | Age: 35
End: 2025-07-03
Payer: COMMERCIAL

## 2025-07-03 NOTE — LETTER
[facility] CARE COORDIN[facility]  [address]  [facility] [address]    July 8, 2025    Livia Fallon  [address]   [facility] [address]      Dear Livia,    I am a clinic community health worker who works with Jhon Solomon PA-C with the [facility]. I have been trying to reach you recently to introduce Clinic Care Coordination. Below is a description of clinic care coordination and how I can further assist you.     Care coordination is offered out of the [facility] as well if Kim Colindres PA-C, is your current primary care provider.      The clinic care coordination team is made up of a registered nurse, , financial resource worker and community health worker who understand the health care system. The goal of clinic care coordination is to help you manage your health and improve access to the health care system. Our team works alongside your provider to assist you in determining your health and social needs. We can help you obtain health care and community resources, providing you with necessary information and education. We can work with you through any barriers and develop a care plan that helps coordinate and strengthen the communication between you and your care team.  Our services are voluntary and are offered without charge to you personally.    Please feel free to contact me with any questions or concerns regarding care coordination and what we can offer.      We are focused on providing you with the highest-quality healthcare experience possible.      Sincerely,     Lynn Thompson  Community Health Worker  [facility]  [phone]

## 2025-07-03 NOTE — PROGRESS NOTES
Clinic Care Coordination Contact  [facility]/Voicemail    Clinical Data: Care Coordinator Outreach    Outreach Documentation Number of Outreach Attempt   7/3/2025   9:42 AM 1       Unable to leave a message due to: Voicemail is not set up.      Plan: Care Coordinator will try to reach patient again in 1-2 business days.    Lynn Thompson  Community Health Worker  [facility]  [phone]

## 2025-07-07 NOTE — PROGRESS NOTES
Clinic Care Coordination Contact  [facility]/Voicemail      Clinical Data: Care Coordinator Outreach    Outreach Documentation Number of Outreach Attempt   7/3/2025   9:42 AM 1   7/7/2025   3:55 PM 2       Left message on patient's voicemail with call back information and requested return call.      Plan: Care Coordinator will try to reach patient again in 1-2 business days.    Lynn Thompson  Community Health Worker  [facility]  [phone]

## 2025-07-08 NOTE — PROGRESS NOTES
Clinic Care Coordination Contact  [facility]/Voicemail    HX:  Pt still receiving refills from Emmett Solomon PA-C, but her last 2 annual physical exams were with Kim Colindres PA-C, out of the [facility]. Which is your PCP?      Clinical Data: Care Coordinator Outreach    Outreach Documentation Number of Outreach Attempt   7/3/2025   9:42 AM 1   7/7/2025   3:55 PM 2   7/8/2025  10:59 AM 2       Left message on patient's voicemail with call back information and requested return call.      Plan: Care Coordinator will send care coordination introduction letter with care coordinator contact information and explanation of care coordination services via [employer]. Care Coordinator will do no further outreaches at this time.    Lynn Thompson  Community Health Worker  [facility]  [phone]

## 2025-07-16 ENCOUNTER — PATIENT OUTREACH (OUTPATIENT)
Dept: CARE COORDINATION | Facility: CLINIC | Age: 35
End: 2025-07-16
Payer: COMMERCIAL

## 2025-07-16 NOTE — PROGRESS NOTES
Clinic Care Coordination Contact  [facility]/Voicemail      Clinical Data: Care Coordinator Outreach    Outreach Documentation Number of Outreach Attempt   7/3/2025   9:42 AM 1   7/7/2025   3:55 PM 2   7/8/2025  10:59 AM 2   7/16/2025   9:53 AM 1       Left message on patient's voicemail with call back information and requested return call.      Plan: Care Coordinator will try to reach patient again in 1-2 business days.    Lynn Thompson  Community Health Worker  [facility]  [phone]

## 2025-07-17 NOTE — PROGRESS NOTES
"Clinic Care Coordination Contact  Community Health Worker Initial Outreach      HX:  CC referral from Kim Colindres PA-C, at the [facility] on 7/2/25   CHW UTx2 on 7/7/25 and 7/8/25  Pt left  7/15/25 requesting return phone call      Patient accepts CC: No, no needs for CC at the present time. Patient will be sent Care Coordination introduction letter for future reference. No CC introduction letter sent today, as one previously sent via [employer] on 7/8/25.      As CHW leaving  for pt this afternoon, pt called CHW:  Housing - no housing insecurity at the moment. Pt is renting. Pt took out $5000 loan to be able to pay back rent.   Income - pt is employed   Food - receives SNAP. No food insecurity.  Transportation - drives  Medications - meds affordable  MH - pt reports her MH is \"good for now\".   Genetics referral - hasn't received a phone call to schedule appt. CHW can see 7/3/25 telephone note in EPIC stating genetics and metabolism  referral sent to [facility]. That clinic sent IB message to Kim Colindres PA-C, regarding the mix up of referral being sent to a pediatric clinic. CHW was able to provide pt with adult genetics and metabolism  referral scheduling number: [phone]. Pt is aware she can contact CHW or the [facility] team if she needs further assistance scheduling this appt. Pt verbalizes understanding.  PCP - pt will continue working with Kim Colindres PA-C, for PCP    Lynn Thompson  Community Health Worker  [facility]  [phone]        "

## 2025-07-23 ENCOUNTER — MYC MEDICAL ADVICE (OUTPATIENT)
Dept: FAMILY MEDICINE | Facility: CLINIC | Age: 35
End: 2025-07-23
Payer: COMMERCIAL

## 2025-07-23 ENCOUNTER — PATIENT OUTREACH (OUTPATIENT)
Dept: FAMILY MEDICINE | Facility: CLINIC | Age: 35
End: 2025-07-23
Payer: COMMERCIAL

## 2025-07-23 NOTE — TELEPHONE ENCOUNTER
Patient Quality Outreach    Patient is due for the following:   Depression  -  PHQ-9 needed    Action(s) Taken:   Patient was assigned appropriate questionnaire to complete    Type of outreach:    Sent [employer] message.    Questions for provider review:    None         Lucita Olmedo [facility]  Chart routed to None.

## 2025-07-26 ENCOUNTER — MYC REFILL (OUTPATIENT)
Dept: FAMILY MEDICINE | Facility: CLINIC | Age: 35
End: 2025-07-26
Payer: COMMERCIAL

## 2025-07-26 DIAGNOSIS — E28.2 PCOS (POLYCYSTIC OVARIAN SYNDROME): ICD-10-CM

## 2025-07-27 ASSESSMENT — PATIENT HEALTH QUESTIONNAIRE - PHQ9
SUM OF ALL RESPONSES TO PHQ QUESTIONS 1-9: 4
SUM OF ALL RESPONSES TO PHQ QUESTIONS 1-9: 4
10. IF YOU CHECKED OFF ANY PROBLEMS, HOW DIFFICULT HAVE THESE PROBLEMS MADE IT FOR YOU TO DO YOUR WORK, TAKE CARE OF THINGS AT HOME, OR GET ALONG WITH OTHER PEOPLE: SOMEWHAT DIFFICULT

## 2025-07-28 NOTE — TELEPHONE ENCOUNTER
Clinic RN: Please investigate patient's chart or contact patient if the information cannot be found because patient should have run out of this medication on December 2023. Confirm patient is taking this medication as prescribed. Document findings and route refill encounter to provider for approval or denial.    Keely Preston RN on 7/28/2025 at 9:52 AM

## 2025-07-28 NOTE — TELEPHONE ENCOUNTER
Attempt #1. No answer. LVM to call clinic and ask to speak to a nurse. If pt returns call, please confirm pt is taking this medication as prescribed. Document findings and route refill encounter to provider for approval or denial.     Vimal REYNOLDS RN  [facility]

## 2025-07-29 NOTE — TELEPHONE ENCOUNTER
Attempt #2. No answer. LVM to call clinic and ask to speak to a nurse. If pt returns call, please confirm pt is taking this medication as prescribed. Document findings and route refill encounter to provider for approval or denial.      Vimal REYNOLDS RN  [facility]

## 2025-07-30 RX ORDER — ACETAMINOPHEN AND CODEINE PHOSPHATE 120; 12 MG/5ML; MG/5ML
0.35 SOLUTION ORAL DAILY
Qty: 90 TABLET | Refills: 3 | Status: SHIPPED | OUTPATIENT
Start: 2025-07-30

## 2025-07-30 NOTE — TELEPHONE ENCOUNTER
Writer took incoming call back from missed call below. Per patient, there was never a gap in taking this BCP medication, as she was getting it filled through [facility] since 2023. Patient tried to refill it again through [facility], but the request was not going through--which is why she tried refilling with her PCP.    It's been 4 days without taking the medication. Patient preferred pharmacy verified.    Routing message to PCP to review and advise on refill request.    Alba Gonsalez, YASIRN, RN, PHN   [facility]

## 2025-08-14 ENCOUNTER — MYC MEDICAL ADVICE (OUTPATIENT)
Dept: FAMILY MEDICINE | Facility: CLINIC | Age: 35
End: 2025-08-14
Payer: COMMERCIAL

## 2025-08-14 DIAGNOSIS — F32.4 MAJOR DEPRESSIVE DISORDER WITH SINGLE EPISODE, IN PARTIAL REMISSION: ICD-10-CM

## 2025-08-14 DIAGNOSIS — F41.9 ANXIETY: ICD-10-CM

## 2025-08-14 DIAGNOSIS — R41.840 INATTENTION: Primary | ICD-10-CM

## 2025-08-18 ENCOUNTER — PATIENT OUTREACH (OUTPATIENT)
Dept: CARE COORDINATION | Facility: CLINIC | Age: 35
End: 2025-08-18
Payer: COMMERCIAL

## 2025-08-28 ENCOUNTER — MYC REFILL (OUTPATIENT)
Dept: FAMILY MEDICINE | Facility: CLINIC | Age: 35
End: 2025-08-28
Payer: COMMERCIAL

## 2025-08-28 DIAGNOSIS — E28.2 PCOS (POLYCYSTIC OVARIAN SYNDROME): ICD-10-CM

## 2025-08-28 RX ORDER — METFORMIN HYDROCHLORIDE 500 MG/1
1000 TABLET, EXTENDED RELEASE ORAL
Qty: 180 TABLET | Refills: 3 | Status: SHIPPED | OUTPATIENT
Start: 2025-08-28